# Patient Record
Sex: FEMALE | Race: BLACK OR AFRICAN AMERICAN | NOT HISPANIC OR LATINO | Employment: OTHER | URBAN - METROPOLITAN AREA
[De-identification: names, ages, dates, MRNs, and addresses within clinical notes are randomized per-mention and may not be internally consistent; named-entity substitution may affect disease eponyms.]

---

## 2019-10-24 ENCOUNTER — HISTORICAL (OUTPATIENT)
Dept: RADIOLOGY | Facility: HOSPITAL | Age: 39
End: 2019-10-24

## 2021-01-15 ENCOUNTER — HISTORICAL (OUTPATIENT)
Dept: RADIOLOGY | Facility: HOSPITAL | Age: 41
End: 2021-01-15

## 2021-01-15 LAB
ABS NEUT (OLG): 3.4 X10(3)/MCL (ref 1.5–6.9)
ALBUMIN SERPL-MCNC: 3.7 GM/DL (ref 3.5–5)
ALBUMIN/GLOB SERPL: 0.8 RATIO (ref 1.1–2)
ALP SERPL-CCNC: 96 UNIT/L (ref 40–150)
ALT SERPL-CCNC: 21 UNIT/L (ref 0–55)
APPEARANCE, UA: ABNORMAL
AST SERPL-CCNC: 22 UNIT/L (ref 5–34)
BACTERIA SPEC CULT: ABNORMAL /HPF
BILIRUB SERPL-MCNC: 0.3 MG/DL
BILIRUB UR QL STRIP: NEGATIVE
BILIRUBIN DIRECT+TOT PNL SERPL-MCNC: 0.1 MG/DL (ref 0–0.5)
BILIRUBIN DIRECT+TOT PNL SERPL-MCNC: 0.2 MG/DL (ref 0–0.8)
BUN SERPL-MCNC: 12 MG/DL (ref 7–18.7)
CALCIUM SERPL-MCNC: 10.1 MG/DL (ref 8.4–10.2)
CHLORIDE SERPL-SCNC: 101 MMOL/L (ref 98–107)
CHOLEST SERPL-MCNC: 180 MG/DL
CHOLEST/HDLC SERPL: 3 {RATIO} (ref 0–5)
CO2 SERPL-SCNC: 26 MMOL/L (ref 22–29)
COLOR UR: YELLOW
CREAT SERPL-MCNC: 1.05 MG/DL (ref 0.55–1.02)
DEPRECATED CALCIDIOL+CALCIFEROL SERPL-MC: 15.1 NG/ML (ref 30–80)
ERYTHROCYTE [DISTWIDTH] IN BLOOD BY AUTOMATED COUNT: 14.5 % (ref 11.5–17)
GLOBULIN SER-MCNC: 4.9 GM/DL (ref 2.4–3.5)
GLUCOSE (UA): NEGATIVE MG/DL
GLUCOSE SERPL-MCNC: 99 MG/DL (ref 74–100)
HCT VFR BLD AUTO: 43.3 % (ref 36–48)
HDLC SERPL-MCNC: 58 MG/DL (ref 35–60)
HGB BLD-MCNC: 14.3 GM/DL (ref 12–16)
HGB UR QL STRIP: NEGATIVE
KETONES UR QL STRIP: NEGATIVE MG/DL
LDLC SERPL CALC-MCNC: 99 MG/DL (ref 50–140)
LEUKOCYTE ESTERASE UR QL STRIP: ABNORMAL
MCH RBC QN AUTO: 33 PG (ref 27–34)
MCHC RBC AUTO-ENTMCNC: 33 GM/DL (ref 31–36)
MCV RBC AUTO: 101 FL (ref 80–99)
NITRITE UR QL STRIP: POSITIVE
PH UR STRIP: 7 [PH] (ref 4.6–8)
PLATELET # BLD AUTO: 266 X10(3)/MCL (ref 140–400)
PMV BLD AUTO: 10.7 FL (ref 6.8–10)
POTASSIUM SERPL-SCNC: 4.4 MMOL/L (ref 3.5–5.1)
PROT SERPL-MCNC: 8.6 GM/DL (ref 6.4–8.3)
PROT UR QL STRIP: NEGATIVE MG/DL
RBC # BLD AUTO: 4.29 X10(6)/MCL (ref 4.2–5.4)
RBC #/AREA URNS HPF: ABNORMAL /[HPF]
SODIUM SERPL-SCNC: 138 MMOL/L (ref 136–145)
SP GR UR STRIP: 1.02 (ref 1–1.03)
SQUAMOUS EPITHELIAL, UA: ABNORMAL /LPF
TRIGL SERPL-MCNC: 117 MG/DL (ref 37–140)
TSH SERPL-ACNC: 1.41 UIU/ML (ref 0.35–4.94)
UROBILINOGEN UR STRIP-ACNC: 0.2 EU/DL
VLDLC SERPL CALC-MCNC: 23 MG/DL
WBC # SPEC AUTO: 5.4 X10(3)/MCL (ref 4.5–11.5)
WBC #/AREA URNS HPF: ABNORMAL /HPF

## 2022-02-08 ENCOUNTER — HISTORICAL (OUTPATIENT)
Dept: ADMINISTRATIVE | Facility: HOSPITAL | Age: 42
End: 2022-02-08

## 2022-02-08 ENCOUNTER — HISTORICAL (OUTPATIENT)
Dept: RADIOLOGY | Facility: HOSPITAL | Age: 42
End: 2022-02-08

## 2023-03-04 ENCOUNTER — HOSPITAL ENCOUNTER (EMERGENCY)
Facility: HOSPITAL | Age: 43
Discharge: HOME | End: 2023-03-04
Attending: INTERNAL MEDICINE
Payer: MEDICARE

## 2023-03-04 VITALS
DIASTOLIC BLOOD PRESSURE: 104 MMHG | SYSTOLIC BLOOD PRESSURE: 162 MMHG | BODY MASS INDEX: 45.52 KG/M2 | TEMPERATURE: 99 F | WEIGHT: 290 LBS | HEART RATE: 83 BPM | OXYGEN SATURATION: 99 % | HEIGHT: 67 IN | RESPIRATION RATE: 16 BRPM

## 2023-03-04 DIAGNOSIS — N61.1 BREAST ABSCESS: ICD-10-CM

## 2023-03-04 DIAGNOSIS — N64.4 BREAST PAIN, LEFT: Primary | ICD-10-CM

## 2023-03-04 PROCEDURE — 99285 EMERGENCY DEPT VISIT HI MDM: CPT | Mod: 25

## 2023-03-04 PROCEDURE — 25500020 PHARM REV CODE 255: Performed by: INTERNAL MEDICINE

## 2023-03-04 RX ORDER — DOXYCYCLINE 100 MG/1
100 CAPSULE ORAL 2 TIMES DAILY
Qty: 20 CAPSULE | Refills: 0 | Status: SHIPPED | OUTPATIENT
Start: 2023-03-04 | End: 2023-03-14

## 2023-03-04 RX ORDER — TRAMADOL HYDROCHLORIDE 50 MG/1
50 TABLET ORAL EVERY 6 HOURS PRN
Qty: 12 TABLET | Refills: 0 | Status: ON HOLD | OUTPATIENT
Start: 2023-03-04 | End: 2023-09-02

## 2023-03-04 RX ADMIN — IOPAMIDOL 100 ML: 755 INJECTION, SOLUTION INTRAVENOUS at 05:03

## 2023-03-04 NOTE — ED PROVIDER NOTES
Encounter Date: 3/4/2023       History     Chief Complaint   Patient presents with    Breast Pain     Pt reports left breast painful and swollen since wed.       42-year-old female presents to the emergency department with painful left breast with swelling since Wednesday of last week.  Patient states she began with pain and noticed it was very hard and firm and states she has an appointment with her PCP on the  but wanted to come in to get evaluated for this.  Denies any fevers chills or other associated symptoms except for the swelling in the pain to the left breast.      Review of patient's allergies indicates:  No Known Allergies  Past Medical History:   Diagnosis Date    Hypertension     Mental health disorder      Past Surgical History:   Procedure Laterality Date     SECTION      HYSTERECTOMY       No family history on file.  Social History     Tobacco Use    Smoking status: Every Day     Types: Cigarettes    Smokeless tobacco: Never   Substance Use Topics    Alcohol use: Yes     Comment: socially     Review of Systems   Constitutional:  Negative for activity change, appetite change and fever.   HENT:  Negative for congestion, dental problem and sore throat.    Eyes:  Negative for discharge and itching.   Respiratory:  Negative for apnea, chest tightness and shortness of breath.    Cardiovascular:  Negative for chest pain.   Gastrointestinal:  Negative for abdominal distention, abdominal pain and nausea.   Genitourinary:  Negative for dysuria, vaginal bleeding, vaginal discharge and vaginal pain.   Musculoskeletal:  Negative for back pain.   Skin:  Negative for rash.   Neurological:  Negative for dizziness, facial asymmetry and weakness.   Hematological:  Does not bruise/bleed easily.   Psychiatric/Behavioral:  Negative for agitation and behavioral problems.    All other systems reviewed and are negative.    Physical Exam     Initial Vitals [23 1335]   BP Pulse Resp Temp SpO2   (!) 165/99 98  16 98.5 °F (36.9 °C) 100 %      MAP       --         Physical Exam    Nursing note and vitals reviewed.  Constitutional: Vital signs are normal. She appears well-developed and well-nourished.  Non-toxic appearance. She does not have a sickly appearance.   HENT:   Head: Normocephalic and atraumatic.   Right Ear: External ear normal.   Left Ear: External ear normal.   Eyes: Conjunctivae, EOM and lids are normal. Pupils are equal, round, and reactive to light. Lids are everted and swept, no foreign bodies found.   Neck: Trachea normal and phonation normal. Neck supple. No thyroid mass and no thyromegaly present.   Normal range of motion.   Full passive range of motion without pain.     Cardiovascular:  Normal rate, regular rhythm, S1 normal, S2 normal, normal heart sounds, intact distal pulses and normal pulses.           Pulmonary/Chest: Breath sounds normal. No respiratory distress.   Abdominal: Abdomen is soft.   Musculoskeletal:         General: Normal range of motion.      Cervical back: Full passive range of motion without pain, normal range of motion and neck supple.     Lymphadenopathy:     She has no cervical adenopathy.   Neurological: She is alert and oriented to person, place, and time. She has normal strength. GCS score is 15. GCS eye subscore is 4. GCS verbal subscore is 5. GCS motor subscore is 6.   Skin: Skin is warm, dry and intact. Capillary refill takes less than 2 seconds.   Hard firm breast mass palpated along the left lateral breast that extends from around her axilla towards the front of the breast towards the nipple.  No pus or drainage.  No nodules appreciated or any fluctuance.   Psychiatric: She has a normal mood and affect. Her speech is normal and behavior is normal. Judgment normal. Cognition and memory are normal.       ED Course   Procedures  Labs Reviewed - No data to display       Imaging Results              CT Head Without Contrast (Preliminary result)  Result time 03/04/23 18:25:16       Preliminary result by Emmett Beltran MD (03/04/23 18:25:16)                   Narrative:    START OF REPORT:  Technique: CT of the head was performed without intravenous contrast with axial as well as coronal and sagittal images.    Comparison: None.    Dosage Information: Automated Exposure Control was utilized 819.34 mGy.cm.    Clinical history: Possible mets. pt states left breast pains.    Findings:  Hemorrhage: No acute intracranial hemorrhage is seen.  CSF spaces: The ventricles sulci and basal cisterns are within normal limits.  Brain parenchyma: Unremarkable with preservation of the grey white junction throughout.  Cerebellum: Unremarkable.  Sella and skull base: The sella appears to be within normal limits for age.  Intracranial calcifications: Incidental note is made of some pineal region calcification.  Calvarium: No acute linear or depressed skull fracture is seen.    Maxillofacial Structures:  Paranasal sinuses: The visualized paranasal sinuses appear clear with no mucoperiosteal thickening or air fluid levels identified.  Orbits: The orbits appear unremarkable.  Zygomatic arches: The zygomatic arches are intact and unremarkable.  Temporal bones and mastoids: The temporal bones and mastoids appear unremarkable.  TMJ: The mandibular condyles appear normally placed with respect to the mandibular fossa.      Impression:  1. No acute intracranial process identified. Details and other findings as noted above.                          Preliminary result by [employer] Water, Rad Results In (03/04/23 18:25:16)                   Narrative:    START OF REPORT:  Technique: CT of the head was performed without intravenous contrast with axial as well as coronal and sagittal images.    Comparison: None.    Dosage Information: Automated Exposure Control was utilized 819.34 mGy.cm.    Clinical history: Possible mets. pt states left breast pains.    Findings:  Hemorrhage: No acute intracranial hemorrhage is seen.  CSF  spaces: The ventricles sulci and basal cisterns are within normal limits.  Brain parenchyma: Unremarkable with preservation of the grey white junction throughout.  Cerebellum: Unremarkable.  Sella and skull base: The sella appears to be within normal limits for age.  Intracranial calcifications: Incidental note is made of some pineal region calcification.  Calvarium: No acute linear or depressed skull fracture is seen.    Maxillofacial Structures:  Paranasal sinuses: The visualized paranasal sinuses appear clear with no mucoperiosteal thickening or air fluid levels identified.  Orbits: The orbits appear unremarkable.  Zygomatic arches: The zygomatic arches are intact and unremarkable.  Temporal bones and mastoids: The temporal bones and mastoids appear unremarkable.  TMJ: The mandibular condyles appear normally placed with respect to the mandibular fossa.      Impression:  1. No acute intracranial process identified. Details and other findings as noted above.                                         CT Chest Abdomen Pelvis With Contrast (xpd) (In process)    Procedure changed from CT Abdomen Pelvis With Contrast                    Medications   iopamidoL (ISOVUE-370) injection 100 mL (100 mLs Intravenous Given 3/4/23 1708)                 ED Course as of 03/04/23 1810   Sat Mar 04, 2023   1404 Wanted to do US of breast for this patient however I have been informed by the head radiology dept that a radiologist has to be present to have an US of breast done. Will treat this as infectious since it was so rapidly formed and this is highly possible. Patient aware that she should contact her PCP ASAP for referral for US of breast.  [SL]   5173 Spoke to Dr. Mccarthy who will see the patient for follow up and biopsy.  [SL]      ED Course User Index  [SL] AYLEEN Rutledge          Medical Decision Making  42-year-old female with left breast mass presents to the emergency department for evaluation.  Patient states she  noticed this last Wednesday and has not had any treatment for since noticing it.    Problems Addressed:  Breast abscess: acute illness or injury     Details: Will refer for general surgery and have patient follow-up with PCP for breast ultrasound.  Breast pain, left: acute illness or injury     Details: Will treat with short course opioid pain medication.  Patient had safe opioid use discussed.    Amount and/or Complexity of Data Reviewed  External Data Reviewed: notes.  Discussion of management or test interpretation with external provider(s): Patient has breast mass with slight about of worth but no erythema or fluctuance noted.  Discussed with her that she does fairly needed ultrasound of breast tissue but we were unable to do that here.            Clinical Impression:   Final diagnoses:  [N64.4] Breast pain, left (Primary)  [N61.1] Breast abscess        ED Disposition Condition    Discharge Stable          ED Prescriptions       Medication Sig Dispense Start Date End Date Auth. Provider    doxycycline (VIBRAMYCIN) 100 MG Cap Take 1 capsule (100 mg total) by mouth 2 (two) times daily. for 10 days 20 capsule 3/4/2023 3/14/2023 AYLEEN Rutledge    traMADoL (ULTRAM) 50 mg tablet Take 1 tablet (50 mg total) by mouth every 6 (six) hours as needed for Pain. 12 tablet 3/4/2023 -- AYLEEN Rutledge          Follow-up Information       Follow up With Specialties Details Why Contact Info    David Mccarthy MD General Surgery, Cardiology Schedule an appointment as soon as possible for a visit  As needed, For ER Follow Up. [address]  [address]  [place] [address]  [phone]               AYLEEN Rutledge  03/04/23 1420       AYLEEN Rutledge  03/04/23 1433       AYLEEN Rutledge  03/04/23 2807

## 2023-03-04 NOTE — DISCHARGE INSTRUCTIONS
You need to see one of the surgeons to reevaluate, do further workup and treat as needed as soon as possible    Take medicines as prescribed    Talk to your family doctor to refer you to the surgeon.    David Mccarthy MD  [phone]    Jacquie Mccarthy MD  [phone]    Kemi Hodgson MD.  [phone]

## 2023-03-04 NOTE — CONSULTS
Patient is a 42-year-old  female morbidly obese at 5 ft 7 300 lb who had a left breast mass with associated swelling since Wednesday of last week.  Patient noticed a very hard firm area on her left breast.  She has an appointment with her primary care physician Farshad Wade [facility] nurse practitioner on 2023.  Patient denies any fever chills.  She is not on any hormonal therapy.  She is a  AB0 0.    No known drug allergies     Past medical history  1. Neuropathy hands leg and feet etiology unclear making her disabled   2. Hypertension  3. Morbid obesity at 5 ft 7 300 lb   4. Schizophrenia on Risperdal     past surgical history   1. Status post  x2   2. Partial thyroidectomy 20 years ago  3. No EGD or colonoscopy  4. Mammogram Pap smear over 15 years ago   5. Stress test 2 years ago with Holter monitor  6. No echo or angiogram done    Family history  1. Mother had a heart attack   2. Father family history not available he was unavailable but he is living   3. Maternal aunt had breast cancer   4. Maternal aunt had throat cancer      Immunizations  1. No tetanus shot  2. No flu shot  3. No pneumonia shot  4. No COVID shot  5. No shingle shot    Social history  1. Smokes quarter pack a day for about 25 years   2. Drinks 2 bottles of wine a day for about 20 years   3. No drug abuse history  4. No alf parole or probation  5. No  service   6. Was in rehab for schizophrenia in  she was seeing hallucinations  7. Has a 9th grade level of education is illiterate can write her name  8. Disabled from neuropathy hands feet and legs  9. She is unmarried single had 2 partners with 4 children a son that works as a manager at [employer] another son that works at a tire placed and [place] and the daughter that is caregiver and a 3rd daughter the 4th on her homemaker.  One of her daughters is in the room at time my evaluation  10. Lives in [place]  11. Primary care provider is  [place] nurse practitioner Wade    Review of systems   Twelve point review of systems otherwise unremarkable      Physical exam   Vital signs are stable   head ears nose throat is within normal limits  Neck is supple nontender lower collar incision from her thought partial thyroidectomy   Left breast has a grapefruit size mass the mass in his hard firm mobile occupying half the breast, right breast appears to be normal  Heart is regular rate rhythm   Lungs are clear to auscultation  Abdomen is soft nontender nondistended Pfannenstiel incision noted  Extremities are within normal limits  Neurologically intact with the exception of a neuropathy    Assessment  Patient is a 42-year-old  female morbidly obese at 5 ft 7 300 lb who had a left breast mass with associated swelling since Wednesday of last week.  Patient noticed a very hard firm area on her left breast.  She has an appointment with her primary care physician Mr. Wade [facility] nurse practitioner on 2023.  Patient denies any fever chills.  She is not on any hormonal therapy.  She is a  AB0 0.    Plan  1. CT head chest abdomen pelvis to evaluate the extent and severity of her left breast mass  2. Sotero-Cut biopsy of left breast as an outpatient this week  3. Further workup pending above

## 2023-03-06 ENCOUNTER — ANESTHESIA EVENT (OUTPATIENT)
Dept: SURGERY | Facility: HOSPITAL | Age: 43
End: 2023-03-06
Payer: MEDICARE

## 2023-03-06 ENCOUNTER — ANESTHESIA (OUTPATIENT)
Dept: SURGERY | Facility: HOSPITAL | Age: 43
End: 2023-03-06
Payer: MEDICARE

## 2023-03-06 ENCOUNTER — HOSPITAL ENCOUNTER (OUTPATIENT)
Dept: PREADMISSION TESTING | Facility: HOSPITAL | Age: 43
Discharge: HOME | End: 2023-03-06
Attending: SURGERY | Admitting: SURGERY
Payer: MEDICARE

## 2023-03-06 ENCOUNTER — HOSPITAL ENCOUNTER (OUTPATIENT)
Facility: HOSPITAL | Age: 43
Discharge: HOME | End: 2023-03-06
Attending: SURGERY | Admitting: SURGERY
Payer: MEDICARE

## 2023-03-06 VITALS — WEIGHT: 292 LBS | BODY MASS INDEX: 45.83 KG/M2 | HEIGHT: 67 IN

## 2023-03-06 VITALS
RESPIRATION RATE: 20 BRPM | SYSTOLIC BLOOD PRESSURE: 157 MMHG | OXYGEN SATURATION: 96 % | WEIGHT: 292 LBS | TEMPERATURE: 98 F | DIASTOLIC BLOOD PRESSURE: 99 MMHG | HEART RATE: 84 BPM | BODY MASS INDEX: 45.73 KG/M2

## 2023-03-06 DIAGNOSIS — N63.0 MASS OF BREAST, UNSPECIFIED LATERALITY: Primary | ICD-10-CM

## 2023-03-06 DIAGNOSIS — N63.21 UNSPECIFIED LUMP IN THE LEFT BREAST, UPPER OUTER QUADRANT: ICD-10-CM

## 2023-03-06 DIAGNOSIS — N63.21 MASS OF UPPER OUTER QUADRANT OF LEFT BREAST: Primary | ICD-10-CM

## 2023-03-06 DIAGNOSIS — N63.0 MASS OF BREAST, UNSPECIFIED LATERALITY: ICD-10-CM

## 2023-03-06 DIAGNOSIS — Z01.818 PRE-OP TESTING: ICD-10-CM

## 2023-03-06 LAB
ANION GAP SERPL CALC-SCNC: 8 MEQ/L (ref 2–13)
B-HCG SERPL QL: NEGATIVE
BASOPHILS # BLD AUTO: 0.02 X10(3)/MCL (ref 0.01–0.08)
BASOPHILS NFR BLD AUTO: 0.2 % (ref 0.1–1.2)
BUN SERPL-MCNC: 6 MG/DL (ref 7–20)
CALCIUM SERPL-MCNC: 9.4 MG/DL (ref 8.4–10.2)
CHLORIDE SERPL-SCNC: 100 MMOL/L (ref 98–110)
CO2 SERPL-SCNC: 31 MMOL/L (ref 21–32)
CREAT SERPL-MCNC: 0.72 MG/DL (ref 0.66–1.25)
CREAT/UREA NIT SERPL: 8 (ref 12–20)
EOSINOPHIL # BLD AUTO: 0.11 X10(3)/MCL (ref 0.04–0.36)
EOSINOPHIL NFR BLD AUTO: 1.2 % (ref 0.7–7)
ERYTHROCYTE [DISTWIDTH] IN BLOOD BY AUTOMATED COUNT: 18.6 % (ref 11–14.5)
GFR SERPLBLD CREATININE-BSD FMLA CKD-EPI: >90 MLS/MIN/1.73/M2
GLUCOSE SERPL-MCNC: 98 MG/DL (ref 70–115)
HCT VFR BLD AUTO: 33.9 % (ref 36–48)
HGB BLD-MCNC: 11.2 G/DL (ref 11.8–16)
IMM GRANULOCYTES # BLD AUTO: 0.07 X10(3)/MCL (ref 0–0.03)
IMM GRANULOCYTES NFR BLD AUTO: 0.8 % (ref 0–0.5)
LYMPHOCYTES # BLD AUTO: 1.59 X10(3)/MCL (ref 1.16–3.74)
LYMPHOCYTES NFR BLD AUTO: 17.8 % (ref 20–55)
MCH RBC QN AUTO: 33.7 PG (ref 27–34)
MCV RBC AUTO: 102.1 FL (ref 79–99)
MEAN CELL HEMOGLOBIN CONCENTRATION (OHS) G/DL: 33 G/DL (ref 31–37)
MONOCYTES # BLD AUTO: 0.46 X10(3)/MCL (ref 0.24–0.36)
MONOCYTES NFR BLD AUTO: 5.1 % (ref 4.7–12.5)
NEUTROPHILS # BLD AUTO: 6.69 X10(3)/MCL (ref 1.56–6.13)
NEUTROPHILS NFR BLD AUTO: 74.9 % (ref 37–73)
NRBC BLD AUTO-RTO: 0.2 % (ref 0–1)
PLATELET # BLD AUTO: 424 X10(3)/MCL (ref 140–371)
PMV BLD AUTO: 9.2 FL (ref 9.4–12.4)
POTASSIUM SERPL-SCNC: 3.9 MMOL/L (ref 3.5–5.1)
RBC # BLD AUTO: 3.32 X10(6)/MCL (ref 4–5.1)
SODIUM SERPL-SCNC: 139 MMOL/L (ref 135–145)
WBC # SPEC AUTO: 8.9 X10(3)/MCL (ref 4–11.5)

## 2023-03-06 PROCEDURE — 80048 BASIC METABOLIC PNL TOTAL CA: CPT | Performed by: SURGERY

## 2023-03-06 PROCEDURE — 37000008 HC ANESTHESIA 1ST 15 MINUTES: Performed by: SURGERY

## 2023-03-06 PROCEDURE — S5010 5% DEXTROSE AND 0.45% SALINE: HCPCS | Performed by: SURGERY

## 2023-03-06 PROCEDURE — 93010 EKG 12-LEAD: ICD-10-PCS | Mod: ,,, | Performed by: STUDENT IN AN ORGANIZED HEALTH CARE EDUCATION/TRAINING PROGRAM

## 2023-03-06 PROCEDURE — 36000706: Performed by: SURGERY

## 2023-03-06 PROCEDURE — 85025 COMPLETE CBC W/AUTO DIFF WBC: CPT | Performed by: SURGERY

## 2023-03-06 PROCEDURE — 63600175 PHARM REV CODE 636 W HCPCS: Performed by: NURSE ANESTHETIST, CERTIFIED REGISTERED

## 2023-03-06 PROCEDURE — 93010 ELECTROCARDIOGRAM REPORT: CPT | Mod: ,,, | Performed by: STUDENT IN AN ORGANIZED HEALTH CARE EDUCATION/TRAINING PROGRAM

## 2023-03-06 PROCEDURE — 25000003 PHARM REV CODE 250: Performed by: SURGERY

## 2023-03-06 PROCEDURE — D9220A PRA ANESTHESIA: ICD-10-PCS | Mod: ,,, | Performed by: NURSE ANESTHETIST, CERTIFIED REGISTERED

## 2023-03-06 PROCEDURE — 88305 TISSUE EXAM BY PATHOLOGIST: CPT

## 2023-03-06 PROCEDURE — 37000009 HC ANESTHESIA EA ADD 15 MINS: Performed by: SURGERY

## 2023-03-06 PROCEDURE — 81025 URINE PREGNANCY TEST: CPT | Performed by: SURGERY

## 2023-03-06 PROCEDURE — 36000707: Performed by: SURGERY

## 2023-03-06 PROCEDURE — D9220A PRA ANESTHESIA: Mod: ,,, | Performed by: NURSE ANESTHETIST, CERTIFIED REGISTERED

## 2023-03-06 PROCEDURE — 93005 ELECTROCARDIOGRAM TRACING: CPT | Mod: 59

## 2023-03-06 PROCEDURE — 25000003 PHARM REV CODE 250: Performed by: NURSE ANESTHETIST, CERTIFIED REGISTERED

## 2023-03-06 RX ORDER — MIDAZOLAM HYDROCHLORIDE 1 MG/ML
2 INJECTION INTRAMUSCULAR; INTRAVENOUS
Status: DISCONTINUED | OUTPATIENT
Start: 2023-03-07 | End: 2023-03-06 | Stop reason: HOSPADM

## 2023-03-06 RX ORDER — LIDOCAINE HYDROCHLORIDE 20 MG/ML
INJECTION INTRAVENOUS
Status: DISCONTINUED | OUTPATIENT
Start: 2023-03-06 | End: 2023-03-06

## 2023-03-06 RX ORDER — ETOMIDATE 2 MG/ML
INJECTION INTRAVENOUS
Status: DISCONTINUED | OUTPATIENT
Start: 2023-03-06 | End: 2023-03-06

## 2023-03-06 RX ORDER — DEXTROSE MONOHYDRATE AND SODIUM CHLORIDE 5; .45 G/100ML; G/100ML
INJECTION, SOLUTION INTRAVENOUS CONTINUOUS
Status: CANCELLED | OUTPATIENT
Start: 2023-03-06

## 2023-03-06 RX ORDER — FENTANYL CITRATE 50 UG/ML
INJECTION, SOLUTION INTRAMUSCULAR; INTRAVENOUS
Status: DISCONTINUED | OUTPATIENT
Start: 2023-03-06 | End: 2023-03-06

## 2023-03-06 RX ORDER — AMLODIPINE BESYLATE 10 MG/1
10 TABLET ORAL DAILY
COMMUNITY

## 2023-03-06 RX ORDER — CEFAZOLIN SODIUM 2 G/50ML
2 SOLUTION INTRAVENOUS
Status: CANCELLED | OUTPATIENT
Start: 2023-03-06

## 2023-03-06 RX ORDER — CEFAZOLIN SODIUM 2 G/50ML
2 SOLUTION INTRAVENOUS
Status: DISCONTINUED | OUTPATIENT
Start: 2023-03-06 | End: 2023-03-06 | Stop reason: HOSPADM

## 2023-03-06 RX ORDER — PROPOFOL 10 MG/ML
VIAL (ML) INTRAVENOUS
Status: DISCONTINUED | OUTPATIENT
Start: 2023-03-06 | End: 2023-03-06

## 2023-03-06 RX ORDER — FAMOTIDINE 20 MG/1
20 TABLET, FILM COATED ORAL
Status: COMPLETED | OUTPATIENT
Start: 2023-03-06 | End: 2023-03-06

## 2023-03-06 RX ORDER — LIDOCAINE HYDROCHLORIDE 10 MG/ML
INJECTION INFILTRATION; PERINEURAL
Status: DISCONTINUED | OUTPATIENT
Start: 2023-03-06 | End: 2023-03-06 | Stop reason: HOSPADM

## 2023-03-06 RX ORDER — ONDANSETRON 2 MG/ML
4 INJECTION INTRAMUSCULAR; INTRAVENOUS EVERY 12 HOURS PRN
Status: DISCONTINUED | OUTPATIENT
Start: 2023-03-06 | End: 2023-03-06 | Stop reason: HOSPADM

## 2023-03-06 RX ORDER — METOPROLOL TARTRATE 25 MG/1
25 TABLET, FILM COATED ORAL ONCE
Status: COMPLETED | OUTPATIENT
Start: 2023-03-06 | End: 2023-03-06

## 2023-03-06 RX ORDER — METOPROLOL TARTRATE 25 MG/1
25 TABLET, FILM COATED ORAL 2 TIMES DAILY
COMMUNITY

## 2023-03-06 RX ORDER — CEFAZOLIN SODIUM 1 G/3ML
INJECTION, POWDER, FOR SOLUTION INTRAMUSCULAR; INTRAVENOUS
Status: DISCONTINUED | OUTPATIENT
Start: 2023-03-06 | End: 2023-03-06

## 2023-03-06 RX ORDER — BUPIVACAINE HYDROCHLORIDE 5 MG/ML
INJECTION, SOLUTION EPIDURAL; INTRACAUDAL
Status: DISCONTINUED | OUTPATIENT
Start: 2023-03-06 | End: 2023-03-06 | Stop reason: HOSPADM

## 2023-03-06 RX ORDER — DEXTROSE MONOHYDRATE AND SODIUM CHLORIDE 5; .45 G/100ML; G/100ML
INJECTION, SOLUTION INTRAVENOUS CONTINUOUS
Status: DISCONTINUED | OUTPATIENT
Start: 2023-03-06 | End: 2023-03-06 | Stop reason: HOSPADM

## 2023-03-06 RX ORDER — HYDROMORPHONE HYDROCHLORIDE 1 MG/ML
0.5 INJECTION, SOLUTION INTRAMUSCULAR; INTRAVENOUS; SUBCUTANEOUS EVERY 6 HOURS PRN
Status: DISCONTINUED | OUTPATIENT
Start: 2023-03-06 | End: 2023-03-06 | Stop reason: HOSPADM

## 2023-03-06 RX ORDER — SODIUM CHLORIDE 9 MG/ML
INJECTION, SOLUTION INTRAVENOUS CONTINUOUS
Status: DISCONTINUED | OUTPATIENT
Start: 2023-03-06 | End: 2023-03-06 | Stop reason: HOSPADM

## 2023-03-06 RX ADMIN — METOPROLOL TARTRATE 25 MG: 25 TABLET, FILM COATED ORAL at 10:03

## 2023-03-06 RX ADMIN — ETOMIDATE 2 MG: 2 INJECTION INTRAVENOUS at 01:03

## 2023-03-06 RX ADMIN — PROPOFOL 20 MG: 10 INJECTION, EMULSION INTRAVENOUS at 01:03

## 2023-03-06 RX ADMIN — FENTANYL CITRATE 25 MCG: 50 INJECTION, SOLUTION INTRAMUSCULAR; INTRAVENOUS at 01:03

## 2023-03-06 RX ADMIN — GLYCOPYRROLATE 0.2 MG: 0.2 INJECTION, SOLUTION INTRAMUSCULAR; INTRAVITREAL at 10:03

## 2023-03-06 RX ADMIN — CEFAZOLIN 2 G: 1 INJECTION, POWDER, FOR SOLUTION INTRAMUSCULAR; INTRAVENOUS at 01:03

## 2023-03-06 RX ADMIN — LIDOCAINE HYDROCHLORIDE 50 MG: 20 INJECTION, SOLUTION INTRAVENOUS at 01:03

## 2023-03-06 RX ADMIN — PROPOFOL 30 MG: 10 INJECTION, EMULSION INTRAVENOUS at 01:03

## 2023-03-06 RX ADMIN — FENTANYL CITRATE 50 MCG: 50 INJECTION, SOLUTION INTRAMUSCULAR; INTRAVENOUS at 01:03

## 2023-03-06 RX ADMIN — DEXTROSE AND SODIUM CHLORIDE: 5; 450 INJECTION, SOLUTION INTRAVENOUS at 10:03

## 2023-03-06 RX ADMIN — FAMOTIDINE 20 MG: 20 TABLET, FILM COATED ORAL at 10:03

## 2023-03-06 NOTE — ANESTHESIA PREPROCEDURE EVALUATION
03/06/2023  Nichole Lopez is a 42 y.o., female.      Pre-op Assessment    I have reviewed the Patient Summary Reports.     I have reviewed the Nursing Notes. I have reviewed the NPO Status.   I have reviewed the Medications.     Review of Systems  Anesthesia Hx:  No problems with previous Anesthesia  Denies Family Hx of Anesthesia complications.   Denies Personal Hx of Anesthesia complications.   Hematology/Oncology:  Hematology Normal   Oncology Normal     EENT/Dental:EENT/Dental Normal   Cardiovascular:   Exercise tolerance: good Hypertension  Hypertension, Essential Hypertension    Pulmonary:  Pulmonary Normal  Obstructive Sleep Apnea (PATRICK).   Renal/:  Renal/ Normal     Hepatic/GI:  Hepatic/GI Normal    Musculoskeletal:  Musculoskeletal Normal    Neurological:  Neurology Normal    Endocrine:  Endocrine Normal    Dermatological:  Skin Normal    Psych:   Psychiatric History anxiety depression          Physical Exam  General: Well nourished, Cooperative, Alert and Oriented    Airway:  Mallampati: II / II  Mouth Opening: Normal  TM Distance: Normal  Tongue: Normal  Neck ROM: Normal ROM    Dental:  Intact        Anesthesia Plan  Type of Anesthesia, risks & benefits discussed:    Anesthesia Type: Gen ETT, MAC  Intra-op Monitoring Plan: Standard ASA Monitors  Induction:  IV  ASA Score: 4    Ready For Surgery From Anesthesia Perspective.     .

## 2023-03-06 NOTE — DISCHARGE INSTRUCTIONS

## 2023-03-06 NOTE — OP NOTE
[facility]  Operative Note      Date of Procedure: 3/6/2023     Procedure: Procedure(s) (LRB):  BIOPSY, BREAST (Left)     Surgeon(s) and Role:     * David Mccarthy MD - Primary    Assisting Surgeon: None    Pre-Operative Diagnosis: Unspecified lump in the left breast, upper outer quadrant [N63.21]    Post-Operative Diagnosis: Post-Op Diagnosis Codes:     * Unspecified lump in the left breast, upper outer quadrant [N63.21]    Anesthesia: Choice    Operative Findings (including complications, if any):  Patient is a 42-year-old  female with history of neuropathy hands and feet who has schizophrenia on Rocephin all, morbidly obese at 5 ft 7 inches 300 lb had a left breast mass that was 6 cm in size on CT scan.  Patient required needle biopsy.    Patient was brought to the OR supine position local and IV sedation were given and patient had a Sotero-Cut biopsy with a 18 gauge needle performed.  The mass was big enough, I could feel it and as a result not need ultrasound to verify biopsy of the biopsy specimens were obviously potentially malignant.  Patient had several passes with the Sotero-Cut needle done at least 10.  Patient had the insertion site of the Sotero-Cut needle sutured with 4-0 plain gut suture.  Sterile dressings were applied no problems encountered patient tolerated procedure well             Description of Technical Procedures:  Noted above           Significant Surgical Tasks Conducted by the Assistant(s), if Applicable:       Estimated Blood Loss (EBL): * No values recorded between 3/6/2023  1:33 PM and 3/6/2023  1:38 PM *           Implants: * No implants in log *    Specimens:   Specimen (24h ago, onward)      None                    Condition: Good    Disposition: PACU - hemodynamically stable.    Attestation: I was present and scrubbed for the entire procedure.    Discharge Note    OUTCOME: Patient tolerated treatment/procedure well without complication and is  now ready for discharge.    DISPOSITION: Home or Self Care    FINAL DIAGNOSIS:  Left breast mass 6 cm in size    FOLLOWUP: In clinic1 week    DISCHARGE INSTRUCTIONS:  No discharge procedures on file.

## 2023-03-06 NOTE — ANESTHESIA POSTPROCEDURE EVALUATION
Anesthesia Post Evaluation    Patient: Nichole Lopez    Procedure(s) Performed: Procedure(s) (LRB):  BIOPSY, BREAST (Left)    Final Anesthesia Type: MAC      Patient location during evaluation: OPS  Patient participation: Yes- Able to Participate  Level of consciousness: awake and alert, awake and oriented  Post-procedure vital signs: reviewed and stable  Pain management: adequate  Airway patency: patent    PONV status at discharge: No PONV  Anesthetic complications: no      Cardiovascular status: blood pressure returned to baseline  Respiratory status: unassisted, room air and spontaneous ventilation  Hydration status: euvolemic  Follow-up not needed.          Vitals Value Taken Time   /100 03/06/23 1023   Temp 36.2 °C (97.1 °F) 03/06/23 1004   Pulse 77 03/06/23 1004   Resp 18 03/06/23 1004   SpO2 96 % 03/06/23 1004         No case tracking events are documented in the log.      Pain/Johnny Score: No data recorded

## 2023-03-06 NOTE — DISCHARGE SUMMARY
[facility]  Short Stay    Procedure(s) (LRB):  BIOPSY, BREAST (Left) using an 18 gauge Sotero-Cut needle      OUTCOME: Patient tolerated treatment/procedure well without complication and is now ready for discharge.    DISPOSITION: Home or Self Care    FINAL DIAGNOSIS:  Left breast mass 6 cm in size on CT scan    FOLLOWUP: In clinic 1 week    DISCHARGE INSTRUCTIONS:  No discharge procedures on file.     TIME SPENT ON DISCHARGE:   Five minutes

## 2023-03-06 NOTE — DISCHARGE INSTRUCTIONS
DECREASED ACTIVITY TODAY, NO HEAVY LIFTING OR STRAINING, NO PUSHING OR PULLING, NO DRIVING TODAY OR WHILE ON PAIN MEDS, SHOWERS ONLY, TO TUB BATHS, DECREASED ACTIVITY UNTIL FOLLOW UP APPOINTMENT.    CALL YOUR DOCTOR IF YOU HAVE ANY OF THE FOLLOWING: ELEVATED TEMP  OR GREATER, INCREASED SWELLING, FOUL DRAINAGE FROM WOUND/INCISION, PAIN UNRELIEVED BY MEDICATION, NAUSEA AND /OR VOMITING, WEAKNESS, EXCESSIVE BRIGHT RED BLEEDING FROM SITE, DIFFICULTY BREATHING OR SWALLOWING.            Exofin  PATIENT AFTER-CARE INSTRUCTIONS          Your wound has been repaired using Exofin® High Viscosity topical tissue adhesive. The list below is a guide for you to understand and care for your wound following your procedure.          1. Keep the wound dry.     You may occasionally and briefly wet your wound in the shower or bath at the direction of your physician, but do not soak or scrub the wound area. After showering or bathing, gently blot your wound dry with a soft towel.       2. Avoid Topical Medications     Do not apply liquid or ointment medications, lotions, creams, petroleum jelly, mineral oils or any other product to your wound while the Exofin® adhesive film is in place.         3. Do not rub, scratch, or pick at the wound.     Doing so may compromise the integrity of the wound closure and cause scaring.        4. Protect the wound from prolonged sunlight exposure.     Do not use tanning lamps while the film is in place.        5. Check wound appearance.      Some swelling, redness, and pain are common with all wounds and normally will go away as the wound heals. If swelling, redness, or pain increases, or if the wound feels warm to the touch, contact your doctor. Also contact your doctor if the wound edges reopen or separate.        6. Exofin® will naturally slough off between 5 and 10 days after the procedure.     By this time, your wound should be sufficiently healed. This information is not intended as a  substitute for the advice of a physician. For more detailed information, talk to your doctor. Your doctor can choose the best treatment for you in your particular circumstances.               For additional questions and concerns, please consult your doctor.

## 2023-04-17 ENCOUNTER — HOSPITAL ENCOUNTER (OUTPATIENT)
Dept: PREADMISSION TESTING | Facility: HOSPITAL | Age: 43
Discharge: HOME | End: 2023-04-17
Payer: MEDICARE

## 2023-04-17 ENCOUNTER — HOSPITAL ENCOUNTER (OUTPATIENT)
Dept: RADIOLOGY | Facility: HOSPITAL | Age: 43
Discharge: HOME | End: 2023-04-17
Attending: SURGERY
Payer: MEDICARE

## 2023-04-17 VITALS — HEIGHT: 67 IN | WEIGHT: 292.19 LBS | BODY MASS INDEX: 45.86 KG/M2

## 2023-04-17 DIAGNOSIS — N63.20 MASS OF LEFT BREAST, UNSPECIFIED QUADRANT: Primary | ICD-10-CM

## 2023-04-17 DIAGNOSIS — N63.20 MASS OF LEFT BREAST, UNSPECIFIED QUADRANT: ICD-10-CM

## 2023-04-17 PROCEDURE — 71046 X-RAY EXAM CHEST 2 VIEWS: CPT | Mod: TC

## 2023-04-17 RX ORDER — CEFAZOLIN SODIUM IN 0.9 % NACL 2 G/100 ML
2 PLASTIC BAG, INJECTION (ML) INTRAVENOUS
Status: CANCELLED | OUTPATIENT
Start: 2023-04-21

## 2023-04-17 RX ORDER — DEXTROSE MONOHYDRATE AND SODIUM CHLORIDE 5; .45 G/100ML; G/100ML
INJECTION, SOLUTION INTRAVENOUS CONTINUOUS
Status: CANCELLED | OUTPATIENT
Start: 2023-04-17

## 2023-04-17 RX ORDER — RISPERIDONE 2 MG/1
2 TABLET ORAL 2 TIMES DAILY
COMMUNITY
Start: 2023-04-06

## 2023-04-17 NOTE — DISCHARGE INSTRUCTIONS

## 2023-04-20 ENCOUNTER — ANESTHESIA EVENT (OUTPATIENT)
Dept: SURGERY | Facility: HOSPITAL | Age: 43
End: 2023-04-20
Payer: MEDICARE

## 2023-04-20 NOTE — ANESTHESIA PREPROCEDURE EVALUATION
04/20/2023  Nichole Lopez is a 42 y.o., female.      Pre-op Assessment    I have reviewed the Patient Summary Reports.     I have reviewed the Nursing Notes. I have reviewed the NPO Status.   I have reviewed the Medications.     Review of Systems  Anesthesia Hx:  No problems with previous Anesthesia  Denies Family Hx of Anesthesia complications.   Denies Personal Hx of Anesthesia complications.   Social:  Smoker, Alcohol Use    Hematology/Oncology:  Hematology Normal   Oncology Normal     EENT/Dental:EENT/Dental Normal   Cardiovascular:   Exercise tolerance: good Hypertension, well controlled    Pulmonary:  Pulmonary Normal    Renal/:  Renal/ Normal     Hepatic/GI:  Hepatic/GI Normal    Musculoskeletal:  Musculoskeletal Normal    Neurological:  Neurology Normal    Endocrine:  Endocrine Normal  Morbid Obesity / BMI > 40  Dermatological:  Skin Normal    Psych:   Psychiatric History anxiety depression          Physical Exam  General: Well nourished, Cooperative, Alert and Oriented    Airway:  Mallampati: II / II  Mouth Opening: Normal  TM Distance: Normal  Tongue: Normal  Neck ROM: Normal ROM    Dental:  Intact        Anesthesia Plan  Type of Anesthesia, risks & benefits discussed:    Anesthesia Type: MAC, Gen ETT  Intra-op Monitoring Plan: Standard ASA Monitors  Post Op Pain Control Plan: multimodal analgesia  Induction:  IV  Airway Plan: Direct  Informed Consent: Informed consent signed with the Patient and all parties understand the risks and agree with anesthesia plan.  All questions answered. Patient consented to blood products? Yes  ASA Score: 3  Day of Surgery Review of History & Physical: H&P Update referred to the surgeon/provider.I have interviewed and examined the patient. I have reviewed the patient's H&P dated: There are no significant changes.     Ready For Surgery From Anesthesia Perspective.      .

## 2023-04-21 ENCOUNTER — HOSPITAL ENCOUNTER (OUTPATIENT)
Facility: HOSPITAL | Age: 43
Discharge: HOME | End: 2023-04-21
Attending: SURGERY | Admitting: SURGERY
Payer: MEDICARE

## 2023-04-21 ENCOUNTER — ANESTHESIA (OUTPATIENT)
Dept: SURGERY | Facility: HOSPITAL | Age: 43
End: 2023-04-21
Payer: MEDICARE

## 2023-04-21 VITALS
BODY MASS INDEX: 45.83 KG/M2 | SYSTOLIC BLOOD PRESSURE: 119 MMHG | DIASTOLIC BLOOD PRESSURE: 74 MMHG | OXYGEN SATURATION: 98 % | HEIGHT: 67 IN | WEIGHT: 292 LBS | HEART RATE: 96 BPM | RESPIRATION RATE: 20 BRPM | TEMPERATURE: 98 F

## 2023-04-21 DIAGNOSIS — N63.20 MASS OF LEFT BREAST, UNSPECIFIED QUADRANT: Primary | ICD-10-CM

## 2023-04-21 DIAGNOSIS — N61.1 BREAST ABSCESS: ICD-10-CM

## 2023-04-21 DIAGNOSIS — N63.21 MASS OF UPPER OUTER QUADRANT OF LEFT BREAST: ICD-10-CM

## 2023-04-21 LAB — B-HCG SERPL QL: NEGATIVE

## 2023-04-21 PROCEDURE — D9220A PRA ANESTHESIA: ICD-10-PCS | Mod: ,,, | Performed by: NURSE ANESTHETIST, CERTIFIED REGISTERED

## 2023-04-21 PROCEDURE — 71000016 HC POSTOP RECOV ADDL HR: Performed by: SURGERY

## 2023-04-21 PROCEDURE — 36000706: Performed by: SURGERY

## 2023-04-21 PROCEDURE — 87070 CULTURE OTHR SPECIMN AEROBIC: CPT | Performed by: SURGERY

## 2023-04-21 PROCEDURE — 71000033 HC RECOVERY, INTIAL HOUR: Performed by: SURGERY

## 2023-04-21 PROCEDURE — 87205 SMEAR GRAM STAIN: CPT | Performed by: SURGERY

## 2023-04-21 PROCEDURE — 25000003 PHARM REV CODE 250: Performed by: SURGERY

## 2023-04-21 PROCEDURE — 36000707: Performed by: SURGERY

## 2023-04-21 PROCEDURE — 63600175 PHARM REV CODE 636 W HCPCS: Performed by: SURGERY

## 2023-04-21 PROCEDURE — D9220A PRA ANESTHESIA: Mod: ,,, | Performed by: NURSE ANESTHETIST, CERTIFIED REGISTERED

## 2023-04-21 PROCEDURE — 87075 CULTR BACTERIA EXCEPT BLOOD: CPT | Performed by: SURGERY

## 2023-04-21 PROCEDURE — C1729 CATH, DRAINAGE: HCPCS | Performed by: SURGERY

## 2023-04-21 PROCEDURE — 37000009 HC ANESTHESIA EA ADD 15 MINS: Performed by: SURGERY

## 2023-04-21 PROCEDURE — 25000003 PHARM REV CODE 250: Performed by: NURSE ANESTHETIST, CERTIFIED REGISTERED

## 2023-04-21 PROCEDURE — 37000008 HC ANESTHESIA 1ST 15 MINUTES: Performed by: SURGERY

## 2023-04-21 PROCEDURE — S5010 5% DEXTROSE AND 0.45% SALINE: HCPCS | Performed by: SURGERY

## 2023-04-21 PROCEDURE — 71000015 HC POSTOP RECOV 1ST HR: Performed by: SURGERY

## 2023-04-21 PROCEDURE — 27201423 OPTIME MED/SURG SUP & DEVICES STERILE SUPPLY: Performed by: SURGERY

## 2023-04-21 PROCEDURE — 63600175 PHARM REV CODE 636 W HCPCS: Performed by: NURSE ANESTHETIST, CERTIFIED REGISTERED

## 2023-04-21 PROCEDURE — 81025 URINE PREGNANCY TEST: CPT | Performed by: SURGERY

## 2023-04-21 RX ORDER — ONDANSETRON 2 MG/ML
4 INJECTION INTRAMUSCULAR; INTRAVENOUS EVERY 12 HOURS PRN
Status: DISCONTINUED | OUTPATIENT
Start: 2023-04-21 | End: 2023-04-21 | Stop reason: HOSPADM

## 2023-04-21 RX ORDER — LIDOCAINE HYDROCHLORIDE 10 MG/ML
INJECTION INFILTRATION; PERINEURAL
Status: DISCONTINUED | OUTPATIENT
Start: 2023-04-21 | End: 2023-04-21 | Stop reason: HOSPADM

## 2023-04-21 RX ORDER — DEXTROSE MONOHYDRATE AND SODIUM CHLORIDE 5; .45 G/100ML; G/100ML
INJECTION, SOLUTION INTRAVENOUS CONTINUOUS
Status: DISCONTINUED | OUTPATIENT
Start: 2023-04-21 | End: 2023-04-21 | Stop reason: HOSPADM

## 2023-04-21 RX ORDER — SODIUM CHLORIDE 9 MG/ML
INJECTION, SOLUTION INTRAVENOUS CONTINUOUS
Status: DISCONTINUED | OUTPATIENT
Start: 2023-04-21 | End: 2023-04-21 | Stop reason: HOSPADM

## 2023-04-21 RX ORDER — KETOROLAC TROMETHAMINE 30 MG/ML
INJECTION, SOLUTION INTRAMUSCULAR; INTRAVENOUS
Status: DISCONTINUED | OUTPATIENT
Start: 2023-04-21 | End: 2023-04-21

## 2023-04-21 RX ORDER — NEOSTIGMINE METHYLSULFATE 1 MG/ML
INJECTION, SOLUTION INTRAVENOUS
Status: DISCONTINUED | OUTPATIENT
Start: 2023-04-21 | End: 2023-04-21

## 2023-04-21 RX ORDER — GLYCOPYRROLATE 0.2 MG/ML
INJECTION INTRAMUSCULAR; INTRAVENOUS
Status: DISCONTINUED | OUTPATIENT
Start: 2023-04-21 | End: 2023-04-21

## 2023-04-21 RX ORDER — CEFAZOLIN SODIUM IN 0.9 % NACL 2 G/100 ML
2 PLASTIC BAG, INJECTION (ML) INTRAVENOUS
Status: DISCONTINUED | OUTPATIENT
Start: 2023-04-21 | End: 2023-04-21

## 2023-04-21 RX ORDER — BUPIVACAINE HYDROCHLORIDE 5 MG/ML
INJECTION, SOLUTION EPIDURAL; INTRACAUDAL
Status: DISCONTINUED | OUTPATIENT
Start: 2023-04-21 | End: 2023-04-21 | Stop reason: HOSPADM

## 2023-04-21 RX ORDER — MIDAZOLAM HYDROCHLORIDE 1 MG/ML
2 INJECTION INTRAMUSCULAR; INTRAVENOUS
Status: COMPLETED | OUTPATIENT
Start: 2023-04-21 | End: 2023-04-21

## 2023-04-21 RX ORDER — HYDROMORPHONE HYDROCHLORIDE 1 MG/ML
0.5 INJECTION, SOLUTION INTRAMUSCULAR; INTRAVENOUS; SUBCUTANEOUS EVERY 6 HOURS PRN
Status: DISCONTINUED | OUTPATIENT
Start: 2023-04-21 | End: 2023-04-21 | Stop reason: HOSPADM

## 2023-04-21 RX ORDER — FAMOTIDINE 20 MG/1
20 TABLET, FILM COATED ORAL
Status: COMPLETED | OUTPATIENT
Start: 2023-04-21 | End: 2023-04-21

## 2023-04-21 RX ORDER — PROPOFOL 10 MG/ML
VIAL (ML) INTRAVENOUS
Status: DISCONTINUED | OUTPATIENT
Start: 2023-04-21 | End: 2023-04-21

## 2023-04-21 RX ORDER — ONDANSETRON 2 MG/ML
INJECTION INTRAMUSCULAR; INTRAVENOUS
Status: DISCONTINUED | OUTPATIENT
Start: 2023-04-21 | End: 2023-04-21

## 2023-04-21 RX ORDER — FENTANYL CITRATE 50 UG/ML
INJECTION, SOLUTION INTRAMUSCULAR; INTRAVENOUS
Status: DISCONTINUED | OUTPATIENT
Start: 2023-04-21 | End: 2023-04-21

## 2023-04-21 RX ORDER — VECURONIUM BROMIDE FOR INJECTION 1 MG/ML
INJECTION, POWDER, LYOPHILIZED, FOR SOLUTION INTRAVENOUS
Status: DISCONTINUED | OUTPATIENT
Start: 2023-04-21 | End: 2023-04-21

## 2023-04-21 RX ADMIN — ONDANSETRON 4 MG: 2 INJECTION INTRAMUSCULAR; INTRAVENOUS at 12:04

## 2023-04-21 RX ADMIN — KETOROLAC TROMETHAMINE 30 MG: 30 INJECTION, SOLUTION INTRAMUSCULAR; INTRAVENOUS at 12:04

## 2023-04-21 RX ADMIN — VECURONIUM BROMIDE 5 MG: 10 INJECTION, POWDER, FOR SOLUTION INTRAVENOUS at 12:04

## 2023-04-21 RX ADMIN — FENTANYL CITRATE 100 MCG: 50 INJECTION, SOLUTION INTRAMUSCULAR; INTRAVENOUS at 01:04

## 2023-04-21 RX ADMIN — FENTANYL CITRATE 100 MCG: 50 INJECTION, SOLUTION INTRAMUSCULAR; INTRAVENOUS at 12:04

## 2023-04-21 RX ADMIN — PROPOFOL 160 MG: 10 INJECTION, EMULSION INTRAVENOUS at 12:04

## 2023-04-21 RX ADMIN — GLYCOPYRROLATE 0.2 MG: 0.2 INJECTION INTRAMUSCULAR; INTRAVENOUS at 01:04

## 2023-04-21 RX ADMIN — DEXTROSE AND SODIUM CHLORIDE: 5; 450 INJECTION, SOLUTION INTRAVENOUS at 11:04

## 2023-04-21 RX ADMIN — FAMOTIDINE 20 MG: 20 TABLET, FILM COATED ORAL at 11:04

## 2023-04-21 RX ADMIN — CEFAZOLIN 2 G: 2 INJECTION, POWDER, FOR SOLUTION INTRAMUSCULAR; INTRAVENOUS at 12:04

## 2023-04-21 RX ADMIN — GLYCOPYRROLATE 0.2 MG: 0.2 INJECTION, SOLUTION INTRAMUSCULAR; INTRAVITREAL at 11:04

## 2023-04-21 RX ADMIN — NEOSTIGMINE METHYLSULFATE 3 MG: 0.5 INJECTION INTRAVENOUS at 01:04

## 2023-04-21 RX ADMIN — MIDAZOLAM HYDROCHLORIDE 2 MG: 1 INJECTION, SOLUTION INTRAMUSCULAR; INTRAVENOUS at 12:04

## 2023-04-21 RX ADMIN — SODIUM CHLORIDE, POTASSIUM CHLORIDE, SODIUM LACTATE AND CALCIUM CHLORIDE: 600; 310; 30; 20 INJECTION, SOLUTION INTRAVENOUS at 01:04

## 2023-04-21 NOTE — PLAN OF CARE
WHILE EXPLAINING WOUND CARE TO PT SHE EXPRESSED CONCERN THAT SHE WOULD NOT BE ABLE TO PERFORM THE WOUND CARE DUE TO THE LOCATION OF THE INCISION. PT ALSO STATES THAT SHE DOES NOT HAVE FAMILY THAT WOULD BE ABLE TO PERFORM WOUND CARE AND CARE FOR THE DRAIN. DR RAMACHANDRAN NOTIFIED AND HE STATES HOME HEALTH MAY BE ORDERED FOR PT.

## 2023-04-21 NOTE — ANESTHESIA PROCEDURE NOTES
Intubation    Date/Time: 4/21/2023 12:29 PM  Performed by: Sagar Zhang CRNA  Authorized by: Sagar Zhang CRNA     Intubation:     Induction:  Intravenous    Intubated:  Postinduction    Mask Ventilation:  Easy mask    Attempts:  1    Attempted By:  CRNA    Method of Intubation:  Direct    Blade:  James 3    Laryngeal View Grade: Grade I - full view of cords      Difficult Airway Encountered?: No      Complications:  None    Airway Device:  Oral endotracheal tube    Airway Device Size:  7.0    Style/Cuff Inflation:  Cuffed    Tube secured:  20    Secured at:  The lips    Placement Verified By:  Capnometry    Complicating Factors:  None    Findings Post-Intubation:  BS equal bilateral and atraumatic/condition of teeth unchanged

## 2023-04-21 NOTE — PLAN OF CARE
CM consulted for [facility] s/p lumpectomy with FABIOLA Drain.  Jocelyn called and confirmed that [facility] would be able to see patient for care.  Jocelyn called [facility] that stated they had reached their quota for [employer].  I discussed with patient the prospect of HHC.  Patient agreeable to HHC.    Patient's chart faxed to [facility] for review.  Dr.Ben Mccarthy at bedside doing FABIOLA Drain care education.

## 2023-04-21 NOTE — ANESTHESIA POSTPROCEDURE EVALUATION
Anesthesia Post Evaluation    Patient: Nichole Lopez    Procedure(s) Performed: Procedure(s) (LRB):  LUMPECTOMY, BREAST (Left)    Final Anesthesia Type: general      Patient location during evaluation: PACU  Patient participation: Yes- Able to Participate  Level of consciousness: awake and alert, awake and oriented  Post-procedure vital signs: reviewed and stable  Pain management: adequate  Airway patency: patent    PONV status at discharge: No PONV  Anesthetic complications: no      Cardiovascular status: blood pressure returned to baseline  Respiratory status: unassisted, room air and spontaneous ventilation  Hydration status: euvolemic  Follow-up not needed.          Vitals Value Taken Time   BP 97/64 04/21/23 1407   Temp 97.5 04/21/23 1408   Pulse 111 04/21/23 1408   Resp 22 04/21/23 1408   SpO2 93 % 04/21/23 1408   Vitals shown include unvalidated device data.      No case tracking events are documented in the log.      Pain/Johnny Score: No data recorded

## 2023-04-21 NOTE — PLAN OF CARE
PT AWAKE AND ALERT. VSS, RESP EVEN AND UNLABORED. NAD NOTED AT THIS TIME. GLASS OF WATER GIVEN TO PT AND PT TOLERATING WATER WELL. WILL CONTINUE TO MONITOR PT.

## 2023-04-21 NOTE — DISCHARGE SUMMARY
[facility]  Short Stay    Procedure(s) (LRB):  LUMPECTOMY, BREAST (Left)      OUTCOME: Patient tolerated treatment/procedure well without complication and is now ready for discharge.    DISPOSITION: Home or Self Care    FINAL DIAGNOSIS:  Infected hematoma 10 cm completely excised with lumpectomy    FOLLOWUP: In clinic 1 week    DISCHARGE INSTRUCTIONS:  No discharge procedures on file.     TIME SPENT ON DISCHARGE:  5 minutes

## 2023-04-21 NOTE — OP NOTE
[facility]  Operative Note      Date of Procedure: 4/21/2023     Procedure: Procedure(s) (LRB):  LUMPECTOMY, BREAST (Left)     Surgeon(s) and Role:     * David Mccarthy MD - Primary    Assisting Surgeon: None    Pre-Operative Diagnosis: Breast abscess [N61.1]    Post-Operative Diagnosis: Post-Op Diagnosis Codes:     * Breast abscess [N61.1]    Anesthesia: General    Operative Findings (including complications, if any):  42-year-old  female with a history of neuropathy of the, hypertension, schizophrenia on Risperdal, morbidly obese at 5 ft 7 300 lb.  Presents with a left breast mass that on Sotero-Cut needle biopsy was consistent with fibrosis and inflammatory cells and giant possible abscess.  The mass on CT scan noted on 03/05/2023 was 6 cm in diameter appeared to be consistent with an abscess or infection.  Patient underwent it was 10 cm on ultrasound on 04/11/2022 mammogram was benign.  Patient was consented for drainage or possible excision of the mass area with a lumpectomy.  Patient was brought to the operating room supine position general anesthetic prepped and draped in a sterile fashion     Incision was made over the breast mass and upper and lower flaps were created the mass itself was removed in its entirety was filled with what appeared to be old hematoma that was infected that was evacuated and cultured aerobic anaerobic Gram stain were sent.  I sent the mass in its entirety for histopathology to make sure that there was no potential malignancy my suspicion based on morphology is that it is benign liquified infected hematoma.    The deep layer was closed with 3-0 Vicryl skin was closed with 4 plain gut suture a Penrose drain was placed in the deep space to allow for drainage of any bloody effluent.  Bovie cautery with LigaSure and 15 blade scalpel were used for the excision of the mass itself.        Description of Technical Procedures:  Noted above        Significant Surgical Tasks Conducted by the Assistant(s), if Applicable:  None       Estimated Blood Loss (EBL): 450 mL           Implants: * No implants in log *    Specimens:   Specimen (24h ago, onward)       Start     Ordered    04/21/23 1320  Specimen to Pathology General Surgery  Once        References:    Click here for ordering Quick Tip   Question Answer Comment   Procedure Type: General Surgery    Specimen Source Breast, Left SUSPECTED INFECTED LEFT BREAST HEMATOMA   Clinical Information: LEFT BREAST LUMPECTOMY    Release to patient Immediate        04/21/23 1320    04/21/23 1318  Specimen to Pathology  RELEASE UPON ORDERING        References:    Click here for ordering Quick Tip   Question:  Release to patient  Answer:  Immediate    04/21/23 1318                            Condition: Good    Disposition: PACU - hemodynamically stable.    Attestation: I was present and scrubbed for the entire procedure.    Discharge Note    OUTCOME: Patient tolerated treatment/procedure well without complication and is now ready for discharge.      DISPOSITION: Home or Self Care    FINAL DIAGNOSIS:  Left breast mass consistent with a liquified hematoma with possible infection pathology pending    FOLLOWUP: In clinic 1 week    DISCHARGE INSTRUCTIONS:  No discharge procedures on file.

## 2023-04-21 NOTE — PLAN OF CARE
CONTACTED CASE MANAGEMENT TO POSSIBLY GET PT SET UP WITH HOME HEALTH. CHERRIE WITH CASE MANAGEMENT AT BEDSIDE SPEAKING WITH PT.

## 2023-04-22 LAB
GRAM STN SPEC: NORMAL
GRAM STN SPEC: NORMAL

## 2023-04-24 LAB — BACTERIA SPEC ANAEROBE CULT: NORMAL

## 2023-04-26 LAB — BACTERIA FLD CULT: NORMAL

## 2023-09-02 ENCOUNTER — HOSPITAL ENCOUNTER (INPATIENT)
Facility: HOSPITAL | Age: 43
LOS: 3 days | Discharge: HOME | DRG: 189 | End: 2023-09-05
Attending: STUDENT IN AN ORGANIZED HEALTH CARE EDUCATION/TRAINING PROGRAM | Admitting: INTERNAL MEDICINE
Payer: MEDICARE

## 2023-09-02 DIAGNOSIS — I50.9 CHF (CONGESTIVE HEART FAILURE): ICD-10-CM

## 2023-09-02 DIAGNOSIS — R06.02 SHORTNESS OF BREATH: ICD-10-CM

## 2023-09-02 DIAGNOSIS — J18.9 PNEUMONIA OF BOTH LUNGS DUE TO INFECTIOUS ORGANISM, UNSPECIFIED PART OF LUNG: ICD-10-CM

## 2023-09-02 DIAGNOSIS — J18.9 MULTIFOCAL PNEUMONIA: ICD-10-CM

## 2023-09-02 DIAGNOSIS — M79.89 RIGHT LEG SWELLING: ICD-10-CM

## 2023-09-02 DIAGNOSIS — J96.01 ACUTE HYPOXEMIC RESPIRATORY FAILURE: Primary | ICD-10-CM

## 2023-09-02 LAB
ALBUMIN SERPL-MCNC: 3.3 G/DL (ref 3.5–5)
ALBUMIN/GLOB SERPL: 0.8 RATIO (ref 1.1–2)
ALLENS TEST: ABNORMAL
ALP SERPL-CCNC: 113 UNIT/L (ref 40–150)
ALT SERPL-CCNC: 10 UNIT/L (ref 0–55)
APPEARANCE UR: CLEAR
APTT PPP: 30.2 SECONDS (ref 24.6–37.2)
AST SERPL-CCNC: 13 UNIT/L (ref 5–34)
B PERT.PT PRMT NPH QL NAA+NON-PROBE: NOT DETECTED
B-HCG SERPL QL: NEGATIVE
BACTERIA #/AREA URNS AUTO: ABNORMAL /HPF
BASOPHILS # BLD AUTO: 0.02 X10(3)/MCL
BASOPHILS NFR BLD AUTO: 0.2 %
BILIRUB SERPL-MCNC: 0.6 MG/DL
BILIRUB UR QL STRIP.AUTO: NEGATIVE
BNP BLD-MCNC: 61.4 PG/ML
BUN SERPL-MCNC: 5 MG/DL (ref 7–18.7)
C PNEUM DNA NPH QL NAA+NON-PROBE: NOT DETECTED
CALCIUM SERPL-MCNC: 8.5 MG/DL (ref 8.4–10.2)
CHLORIDE SERPL-SCNC: 100 MMOL/L (ref 98–107)
CO2 SERPL-SCNC: 24 MMOL/L (ref 22–29)
COLOR UR: YELLOW
CREAT SERPL-MCNC: 0.78 MG/DL (ref 0.55–1.02)
D DIMER PPP IA.FEU-MCNC: 2.2 UG/ML FEU (ref 0–0.5)
DELSYS: ABNORMAL
EOSINOPHIL # BLD AUTO: 0.06 X10(3)/MCL (ref 0–0.9)
EOSINOPHIL NFR BLD AUTO: 0.6 %
ERYTHROCYTE [DISTWIDTH] IN BLOOD BY AUTOMATED COUNT: 20.4 % (ref 11.5–17)
FLUAV AG UPPER RESP QL IA.RAPID: NOT DETECTED
FLUBV AG UPPER RESP QL IA.RAPID: NOT DETECTED
GFR SERPLBLD CREATININE-BSD FMLA CKD-EPI: >60 MLS/MIN/1.73/M2
GLOBULIN SER-MCNC: 4.4 GM/DL (ref 2.4–3.5)
GLUCOSE SERPL-MCNC: 97 MG/DL (ref 74–100)
GLUCOSE UR QL STRIP.AUTO: NEGATIVE
HADV DNA NPH QL NAA+NON-PROBE: NOT DETECTED
HCO3 UR-SCNC: 23.9 MMOL/L (ref 24–28)
HCOV 229E RNA NPH QL NAA+NON-PROBE: NOT DETECTED
HCOV HKU1 RNA NPH QL NAA+NON-PROBE: NOT DETECTED
HCOV NL63 RNA NPH QL NAA+NON-PROBE: NOT DETECTED
HCOV OC43 RNA NPH QL NAA+NON-PROBE: NOT DETECTED
HCT VFR BLD AUTO: 28.2 % (ref 37–47)
HGB BLD-MCNC: 8.6 G/DL (ref 12–16)
HMPV RNA NPH QL NAA+NON-PROBE: NOT DETECTED
HPIV1 RNA NPH QL NAA+NON-PROBE: NOT DETECTED
HPIV2 RNA NPH QL NAA+NON-PROBE: NOT DETECTED
HPIV3 RNA NPH QL NAA+NON-PROBE: NOT DETECTED
HPIV4 RNA NPH QL NAA+NON-PROBE: NOT DETECTED
IMM GRANULOCYTES # BLD AUTO: 0.23 X10(3)/MCL (ref 0–0.04)
IMM GRANULOCYTES NFR BLD AUTO: 2.3 %
INR PPP: 1.1
KETONES UR QL STRIP.AUTO: NEGATIVE
LACTATE SERPL-SCNC: 1.7 MMOL/L (ref 0.5–2.2)
LACTATE SERPL-SCNC: 3 MMOL/L (ref 0.5–2.2)
LACTATE SERPL-SCNC: 3.6 MMOL/L (ref 0.5–2.2)
LEUKOCYTE ESTERASE UR QL STRIP.AUTO: ABNORMAL
LYMPHOCYTES # BLD AUTO: 1.35 X10(3)/MCL (ref 0.6–4.6)
LYMPHOCYTES NFR BLD AUTO: 13.6 %
M PNEUMO DNA NPH QL NAA+NON-PROBE: NOT DETECTED
MAGNESIUM SERPL-MCNC: 1.9 MG/DL (ref 1.6–2.6)
MCH RBC QN AUTO: 32.7 PG (ref 27–31)
MCHC RBC AUTO-ENTMCNC: 30.5 G/DL (ref 33–36)
MCV RBC AUTO: 107.2 FL (ref 80–94)
MONOCYTES # BLD AUTO: 0.36 X10(3)/MCL (ref 0.1–1.3)
MONOCYTES NFR BLD AUTO: 3.6 %
NEUTROPHILS # BLD AUTO: 7.92 X10(3)/MCL (ref 2.1–9.2)
NEUTROPHILS NFR BLD AUTO: 79.7 %
NITRITE UR QL STRIP.AUTO: POSITIVE
PCO2 BLDA: 41.7 MMHG (ref 35–45)
PH SMN: 7.37 [PH] (ref 7.35–7.45)
PH UR STRIP.AUTO: 6 [PH]
PLATELET # BLD AUTO: 388 X10(3)/MCL (ref 130–400)
PMV BLD AUTO: 9.8 FL (ref 7.4–10.4)
PO2 BLDA: 104 MMHG (ref 80–100)
POC BE: -1 MMOL/L
POC SATURATED O2: 98 % (ref 95–100)
POC TCO2: 25 MMOL/L (ref 23–27)
POTASSIUM SERPL-SCNC: 3.5 MMOL/L (ref 3.5–5.1)
PROT SERPL-MCNC: 7.7 GM/DL (ref 6.4–8.3)
PROT UR QL STRIP.AUTO: ABNORMAL
PROTHROMBIN TIME: 14.6 SECONDS (ref 12.5–14.5)
RBC # BLD AUTO: 2.63 X10(6)/MCL (ref 4.2–5.4)
RBC #/AREA URNS AUTO: ABNORMAL /HPF
RBC UR QL AUTO: NEGATIVE
RSV RNA NPH QL NAA+NON-PROBE: NOT DETECTED
RV+EV RNA NPH QL NAA+NON-PROBE: NOT DETECTED
SAMPLE: ABNORMAL
SARS-COV-2 RNA RESP QL NAA+PROBE: NOT DETECTED
SITE: ABNORMAL
SODIUM SERPL-SCNC: 137 MMOL/L (ref 136–145)
SP GR UR STRIP.AUTO: <=1.005 (ref 1–1.03)
SQUAMOUS #/AREA URNS AUTO: ABNORMAL /HPF
TROPONIN I SERPL-MCNC: <0.01 NG/ML (ref 0–0.04)
TSH SERPL-ACNC: 1.86 UIU/ML (ref 0.35–4.94)
UROBILINOGEN UR STRIP-ACNC: 1
WBC # SPEC AUTO: 9.94 X10(3)/MCL (ref 4.5–11.5)
WBC #/AREA URNS AUTO: ABNORMAL /HPF

## 2023-09-02 PROCEDURE — 21400001 HC TELEMETRY ROOM

## 2023-09-02 PROCEDURE — 82803 BLOOD GASES ANY COMBINATION: CPT

## 2023-09-02 PROCEDURE — 93005 ELECTROCARDIOGRAM TRACING: CPT

## 2023-09-02 PROCEDURE — 81001 URINALYSIS AUTO W/SCOPE: CPT | Performed by: STUDENT IN AN ORGANIZED HEALTH CARE EDUCATION/TRAINING PROGRAM

## 2023-09-02 PROCEDURE — 87798 DETECT AGENT NOS DNA AMP: CPT | Performed by: STUDENT IN AN ORGANIZED HEALTH CARE EDUCATION/TRAINING PROGRAM

## 2023-09-02 PROCEDURE — 63600175 PHARM REV CODE 636 W HCPCS: Performed by: INTERNAL MEDICINE

## 2023-09-02 PROCEDURE — 87633 RESP VIRUS 12-25 TARGETS: CPT | Performed by: STUDENT IN AN ORGANIZED HEALTH CARE EDUCATION/TRAINING PROGRAM

## 2023-09-02 PROCEDURE — 83735 ASSAY OF MAGNESIUM: CPT | Performed by: STUDENT IN AN ORGANIZED HEALTH CARE EDUCATION/TRAINING PROGRAM

## 2023-09-02 PROCEDURE — 25000003 PHARM REV CODE 250: Performed by: INTERNAL MEDICINE

## 2023-09-02 PROCEDURE — 99900035 HC TECH TIME PER 15 MIN (STAT)

## 2023-09-02 PROCEDURE — 84484 ASSAY OF TROPONIN QUANT: CPT | Performed by: STUDENT IN AN ORGANIZED HEALTH CARE EDUCATION/TRAINING PROGRAM

## 2023-09-02 PROCEDURE — 11000001 HC ACUTE MED/SURG PRIVATE ROOM

## 2023-09-02 PROCEDURE — 84443 ASSAY THYROID STIM HORMONE: CPT | Performed by: STUDENT IN AN ORGANIZED HEALTH CARE EDUCATION/TRAINING PROGRAM

## 2023-09-02 PROCEDURE — 0240U COVID/FLU A&B PCR: CPT | Performed by: STUDENT IN AN ORGANIZED HEALTH CARE EDUCATION/TRAINING PROGRAM

## 2023-09-02 PROCEDURE — 94761 N-INVAS EAR/PLS OXIMETRY MLT: CPT

## 2023-09-02 PROCEDURE — 83605 ASSAY OF LACTIC ACID: CPT | Performed by: STUDENT IN AN ORGANIZED HEALTH CARE EDUCATION/TRAINING PROGRAM

## 2023-09-02 PROCEDURE — 93010 ELECTROCARDIOGRAM REPORT: CPT | Mod: ,,, | Performed by: INTERNAL MEDICINE

## 2023-09-02 PROCEDURE — 85379 FIBRIN DEGRADATION QUANT: CPT | Performed by: STUDENT IN AN ORGANIZED HEALTH CARE EDUCATION/TRAINING PROGRAM

## 2023-09-02 PROCEDURE — 85610 PROTHROMBIN TIME: CPT | Performed by: STUDENT IN AN ORGANIZED HEALTH CARE EDUCATION/TRAINING PROGRAM

## 2023-09-02 PROCEDURE — 83880 ASSAY OF NATRIURETIC PEPTIDE: CPT | Performed by: STUDENT IN AN ORGANIZED HEALTH CARE EDUCATION/TRAINING PROGRAM

## 2023-09-02 PROCEDURE — 87040 BLOOD CULTURE FOR BACTERIA: CPT | Performed by: STUDENT IN AN ORGANIZED HEALTH CARE EDUCATION/TRAINING PROGRAM

## 2023-09-02 PROCEDURE — 25000242 PHARM REV CODE 250 ALT 637 W/ HCPCS: Performed by: STUDENT IN AN ORGANIZED HEALTH CARE EDUCATION/TRAINING PROGRAM

## 2023-09-02 PROCEDURE — 27000221 HC OXYGEN, UP TO 24 HOURS

## 2023-09-02 PROCEDURE — A4216 STERILE WATER/SALINE, 10 ML: HCPCS | Performed by: INTERNAL MEDICINE

## 2023-09-02 PROCEDURE — 80053 COMPREHEN METABOLIC PANEL: CPT | Performed by: STUDENT IN AN ORGANIZED HEALTH CARE EDUCATION/TRAINING PROGRAM

## 2023-09-02 PROCEDURE — 94640 AIRWAY INHALATION TREATMENT: CPT

## 2023-09-02 PROCEDURE — 96361 HYDRATE IV INFUSION ADD-ON: CPT

## 2023-09-02 PROCEDURE — 96365 THER/PROPH/DIAG IV INF INIT: CPT

## 2023-09-02 PROCEDURE — 99291 CRITICAL CARE FIRST HOUR: CPT

## 2023-09-02 PROCEDURE — 87070 CULTURE OTHR SPECIMN AEROBIC: CPT | Performed by: STUDENT IN AN ORGANIZED HEALTH CARE EDUCATION/TRAINING PROGRAM

## 2023-09-02 PROCEDURE — 25500020 PHARM REV CODE 255: Performed by: STUDENT IN AN ORGANIZED HEALTH CARE EDUCATION/TRAINING PROGRAM

## 2023-09-02 PROCEDURE — 85025 COMPLETE CBC W/AUTO DIFF WBC: CPT | Performed by: STUDENT IN AN ORGANIZED HEALTH CARE EDUCATION/TRAINING PROGRAM

## 2023-09-02 PROCEDURE — 85730 THROMBOPLASTIN TIME PARTIAL: CPT | Performed by: STUDENT IN AN ORGANIZED HEALTH CARE EDUCATION/TRAINING PROGRAM

## 2023-09-02 PROCEDURE — 81025 URINE PREGNANCY TEST: CPT | Performed by: STUDENT IN AN ORGANIZED HEALTH CARE EDUCATION/TRAINING PROGRAM

## 2023-09-02 PROCEDURE — 25000003 PHARM REV CODE 250: Performed by: STUDENT IN AN ORGANIZED HEALTH CARE EDUCATION/TRAINING PROGRAM

## 2023-09-02 PROCEDURE — 93010 EKG 12-LEAD: ICD-10-PCS | Mod: ,,, | Performed by: INTERNAL MEDICINE

## 2023-09-02 PROCEDURE — 83605 ASSAY OF LACTIC ACID: CPT | Performed by: INTERNAL MEDICINE

## 2023-09-02 PROCEDURE — 84145 PROCALCITONIN (PCT): CPT | Performed by: INTERNAL MEDICINE

## 2023-09-02 PROCEDURE — 63600175 PHARM REV CODE 636 W HCPCS: Performed by: STUDENT IN AN ORGANIZED HEALTH CARE EDUCATION/TRAINING PROGRAM

## 2023-09-02 PROCEDURE — 36600 WITHDRAWAL OF ARTERIAL BLOOD: CPT

## 2023-09-02 RX ORDER — IBUPROFEN 200 MG
1 TABLET ORAL DAILY
Status: DISCONTINUED | OUTPATIENT
Start: 2023-09-03 | End: 2023-09-05 | Stop reason: HOSPADM

## 2023-09-02 RX ORDER — METOPROLOL TARTRATE 25 MG/1
25 TABLET, FILM COATED ORAL 2 TIMES DAILY
Status: DISCONTINUED | OUTPATIENT
Start: 2023-09-02 | End: 2023-09-02

## 2023-09-02 RX ORDER — RISPERIDONE 1 MG/1
2 TABLET ORAL 2 TIMES DAILY
Status: DISCONTINUED | OUTPATIENT
Start: 2023-09-02 | End: 2023-09-02

## 2023-09-02 RX ORDER — FUROSEMIDE 10 MG/ML
40 INJECTION INTRAMUSCULAR; INTRAVENOUS EVERY 12 HOURS
Status: DISCONTINUED | OUTPATIENT
Start: 2023-09-02 | End: 2023-09-02

## 2023-09-02 RX ORDER — ONDANSETRON 2 MG/ML
4 INJECTION INTRAMUSCULAR; INTRAVENOUS EVERY 6 HOURS PRN
Status: DISCONTINUED | OUTPATIENT
Start: 2023-09-02 | End: 2023-09-05 | Stop reason: HOSPADM

## 2023-09-02 RX ORDER — LOSARTAN POTASSIUM 50 MG/1
50 TABLET ORAL DAILY
Status: DISCONTINUED | OUTPATIENT
Start: 2023-09-02 | End: 2023-09-03

## 2023-09-02 RX ORDER — SODIUM CHLORIDE 0.9 % (FLUSH) 0.9 %
10 SYRINGE (ML) INJECTION EVERY 8 HOURS
Status: DISCONTINUED | OUTPATIENT
Start: 2023-09-02 | End: 2023-09-05 | Stop reason: HOSPADM

## 2023-09-02 RX ORDER — IPRATROPIUM BROMIDE AND ALBUTEROL SULFATE 2.5; .5 MG/3ML; MG/3ML
3 SOLUTION RESPIRATORY (INHALATION) EVERY 6 HOURS PRN
Status: DISCONTINUED | OUTPATIENT
Start: 2023-09-02 | End: 2023-09-05 | Stop reason: HOSPADM

## 2023-09-02 RX ORDER — HYDRALAZINE HYDROCHLORIDE 20 MG/ML
10 INJECTION INTRAMUSCULAR; INTRAVENOUS EVERY 6 HOURS PRN
Status: DISCONTINUED | OUTPATIENT
Start: 2023-09-02 | End: 2023-09-02

## 2023-09-02 RX ORDER — FUROSEMIDE 10 MG/ML
40 INJECTION INTRAMUSCULAR; INTRAVENOUS EVERY 8 HOURS
Status: DISCONTINUED | OUTPATIENT
Start: 2023-09-02 | End: 2023-09-03

## 2023-09-02 RX ORDER — GUAIFENESIN 100 MG/5ML
200 SOLUTION ORAL EVERY 4 HOURS PRN
Status: DISCONTINUED | OUTPATIENT
Start: 2023-09-02 | End: 2023-09-05 | Stop reason: HOSPADM

## 2023-09-02 RX ORDER — AMLODIPINE BESYLATE 10 MG/1
10 TABLET ORAL DAILY
Status: DISCONTINUED | OUTPATIENT
Start: 2023-09-02 | End: 2023-09-03

## 2023-09-02 RX ORDER — IPRATROPIUM BROMIDE AND ALBUTEROL SULFATE 2.5; .5 MG/3ML; MG/3ML
3 SOLUTION RESPIRATORY (INHALATION)
Status: COMPLETED | OUTPATIENT
Start: 2023-09-02 | End: 2023-09-02

## 2023-09-02 RX ORDER — ENOXAPARIN SODIUM 100 MG/ML
40 INJECTION SUBCUTANEOUS EVERY 24 HOURS
Status: DISCONTINUED | OUTPATIENT
Start: 2023-09-02 | End: 2023-09-05 | Stop reason: HOSPADM

## 2023-09-02 RX ORDER — RISPERIDONE 1 MG/1
2 TABLET ORAL 2 TIMES DAILY
Status: DISCONTINUED | OUTPATIENT
Start: 2023-09-02 | End: 2023-09-05 | Stop reason: HOSPADM

## 2023-09-02 RX ORDER — AMLODIPINE BESYLATE 10 MG/1
10 TABLET ORAL DAILY
Status: DISCONTINUED | OUTPATIENT
Start: 2023-09-02 | End: 2023-09-02

## 2023-09-02 RX ORDER — LABETALOL HYDROCHLORIDE 5 MG/ML
10 INJECTION, SOLUTION INTRAVENOUS EVERY 6 HOURS PRN
Status: DISCONTINUED | OUTPATIENT
Start: 2023-09-02 | End: 2023-09-05 | Stop reason: HOSPADM

## 2023-09-02 RX ORDER — ACETAMINOPHEN 325 MG/1
650 TABLET ORAL EVERY 8 HOURS PRN
Status: DISCONTINUED | OUTPATIENT
Start: 2023-09-02 | End: 2023-09-05 | Stop reason: HOSPADM

## 2023-09-02 RX ORDER — METOPROLOL TARTRATE 25 MG/1
25 TABLET, FILM COATED ORAL 2 TIMES DAILY
Status: DISCONTINUED | OUTPATIENT
Start: 2023-09-02 | End: 2023-09-05 | Stop reason: HOSPADM

## 2023-09-02 RX ORDER — POTASSIUM CHLORIDE 750 MG/1
10 TABLET, EXTENDED RELEASE ORAL DAILY
Status: DISCONTINUED | OUTPATIENT
Start: 2023-09-02 | End: 2023-09-03

## 2023-09-02 RX ADMIN — POTASSIUM CHLORIDE 10 MEQ: 750 TABLET, FILM COATED, EXTENDED RELEASE ORAL at 04:09

## 2023-09-02 RX ADMIN — IPRATROPIUM BROMIDE AND ALBUTEROL SULFATE 3 ML: .5; 3 SOLUTION RESPIRATORY (INHALATION) at 04:09

## 2023-09-02 RX ADMIN — SODIUM CHLORIDE, PRESERVATIVE FREE 10 ML: 5 INJECTION INTRAVENOUS at 01:09

## 2023-09-02 RX ADMIN — AMLODIPINE BESYLATE 10 MG: 5 TABLET ORAL at 09:09

## 2023-09-02 RX ADMIN — SODIUM CHLORIDE 1848 ML: 9 INJECTION, SOLUTION INTRAVENOUS at 07:09

## 2023-09-02 RX ADMIN — SODIUM CHLORIDE, PRESERVATIVE FREE 10 ML: 5 INJECTION INTRAVENOUS at 10:09

## 2023-09-02 RX ADMIN — LOSARTAN POTASSIUM 50 MG: 50 TABLET, FILM COATED ORAL at 04:09

## 2023-09-02 RX ADMIN — ENOXAPARIN SODIUM 40 MG: 40 INJECTION SUBCUTANEOUS at 04:09

## 2023-09-02 RX ADMIN — PIPERACILLIN AND TAZOBACTAM 4.5 G: 4; .5 INJECTION, POWDER, LYOPHILIZED, FOR SOLUTION INTRAVENOUS; PARENTERAL at 09:09

## 2023-09-02 RX ADMIN — IOPAMIDOL 100 ML: 755 INJECTION, SOLUTION INTRAVENOUS at 04:09

## 2023-09-02 RX ADMIN — METOPROLOL TARTRATE 25 MG: 25 TABLET, FILM COATED ORAL at 09:09

## 2023-09-02 RX ADMIN — PIPERACILLIN AND TAZOBACTAM 4.5 G: 4; .5 INJECTION, POWDER, LYOPHILIZED, FOR SOLUTION INTRAVENOUS; PARENTERAL at 02:09

## 2023-09-02 RX ADMIN — RISPERIDONE 2 MG: 1 TABLET ORAL at 09:09

## 2023-09-02 RX ADMIN — FUROSEMIDE 40 MG: 10 INJECTION, SOLUTION INTRAMUSCULAR; INTRAVENOUS at 09:09

## 2023-09-02 RX ADMIN — PIPERACILLIN AND TAZOBACTAM 4.5 G: 4; .5 INJECTION, POWDER, LYOPHILIZED, FOR SOLUTION INTRAVENOUS; PARENTERAL at 05:09

## 2023-09-02 RX ADMIN — AZITHROMYCIN MONOHYDRATE 500 MG: 500 INJECTION, POWDER, LYOPHILIZED, FOR SOLUTION INTRAVENOUS at 01:09

## 2023-09-02 RX ADMIN — FUROSEMIDE 40 MG: 10 INJECTION, SOLUTION INTRAMUSCULAR; INTRAVENOUS at 02:09

## 2023-09-02 NOTE — PLAN OF CARE
Problem: Adult Inpatient Plan of Care  Goal: Plan of Care Review  Outcome: Ongoing, Progressing  Goal: Patient-Specific Goal (Individualized)  Outcome: Ongoing, Progressing  Goal: Absence of Hospital-Acquired Illness or Injury  Outcome: Ongoing, Progressing  Goal: Optimal Comfort and Wellbeing  Outcome: Ongoing, Progressing  Goal: Readiness for Transition of Care  Outcome: Ongoing, Progressing     Problem: Bariatric Environmental Safety  Goal: Safety Maintained with Care  Outcome: Ongoing, Progressing     Problem: Behavioral Health Comorbidity  Goal: Maintenance of Behavioral Health Symptom Control  Outcome: Ongoing, Progressing     Problem: Hypertension Comorbidity  Goal: Blood Pressure in Desired Range  Outcome: Ongoing, Progressing     Problem: Pain Acute  Goal: Acceptable Pain Control and Functional Ability  Outcome: Ongoing, Progressing     Problem: Fatigue  Goal: Improved Activity Tolerance  Outcome: Ongoing, Progressing     Problem: Fall Injury Risk  Goal: Absence of Fall and Fall-Related Injury  Outcome: Ongoing, Progressing     Problem: Infection  Goal: Absence of Infection Signs and Symptoms  Outcome: Ongoing, Progressing     Problem: Balance Impairment (Functional Deficit)  Goal: Improved Balance and Postural Control  Outcome: Ongoing, Progressing     Problem: Cognitive Impairment (Functional Deficit)  Goal: Optimal Functional [place]  Outcome: Ongoing, Progressing     Problem: Coordination Impairment (Functional Deficit)  Goal: Optimal Coordination  Outcome: Ongoing, Progressing     Problem: Muscle Strength Impairment (Functional Deficit)  Goal: Improved Muscle Strength  Outcome: Ongoing, Progressing     Problem: Muscle Tone Impairment (Functional Deficit)  Goal: Improved Muscle Tone  Outcome: Ongoing, Progressing     Problem: Range of Motion Impairment (Functional Deficit)  Goal: Optimal Range of Motion  Outcome: Ongoing, Progressing     Problem: Sensory Impairment (Functional Deficit)  Goal:  Compensation for Sensory Deficit  Outcome: Ongoing, Progressing     Problem: Fluid Imbalance (Pneumonia)  Goal: Fluid Balance  Outcome: Ongoing, Progressing     Problem: Infection (Pneumonia)  Goal: Resolution of Infection Signs and Symptoms  Outcome: Ongoing, Progressing     Problem: Respiratory Compromise (Pneumonia)  Goal: Effective Oxygenation and Ventilation  Outcome: Ongoing, Progressing

## 2023-09-02 NOTE — H&P
dChief Complaint; shortness of breath for the past few weeks    HPI:   History was essentially by the patient and the patient's sister who was present at the time of my evaluation.  Patient is a 42 y.o. morbidly obese female with a medical history of hypertension and psychiatric disorder presents to the ER on account of progressive shortness of breath over the past.    Patient has been at usual state of health until about few weeks ago when she developed progressive shortness of breath associated with cough productive of whitish sputum.  Also admits to easy fatigability.  With mild bilateral lower extremity swelling especially on the right lower extremities.  She denies any chest pain or palpitation..  Occasional nausea and vomiting.  No fever.  She decided to come to the ER for further evaluation.    At the ER, she was evaluated, laboratory testing at the ER reviewed by me.  Chest x-ray shows evidence of pulmonary edema.  CT of the chest shows evidence of pulmonary edema.  And negative for pulmonary embolism.  COVID-19 testing negative.  Venous Doppler ultrasound of the lower extremities negative for DVT.  Patient was placed on high-flow nasal cannula oxygen therapy.  Currently on 4 L of nasal cannula oxygen therapy.  ABG results shows evidence of acute hypoxic respiratory failure.    PCP Wade Smith NP MD        Past Medical History:   Diagnosis Date    Hypertension     Mental health disorder         Past Surgical History:   Procedure Laterality Date    BILATERAL TUBAL LIGATION Bilateral     BREAST BIOPSY Left 3/6/2023    Procedure: BIOPSY, BREAST;  Surgeon: David Mccarthy MD;  Location: [facility] OR;  Service: General;  Laterality: Left;     SECTION      LUMPECTOMY, BREAST Left 2023    Procedure: LUMPECTOMY, BREAST;  Surgeon: David Mccarthy MD;  Location: [facility] OR;  Service: General;  Laterality: Left;  **have ligasure available**    THYROID SURGERY          Medications Prior to Admission    Medication Sig Dispense Refill Last Dose    amLODIPine (NORVASC) 10 MG tablet Take 10 mg by mouth once daily.       metoprolol tartrate (LOPRESSOR) 25 MG tablet Take 25 mg by mouth 2 (two) times daily.       risperiDONE (RISPERDAL) 2 MG tablet Take 2 mg by mouth 2 (two) times daily.       [DISCONTINUED] traMADoL (ULTRAM) 50 mg tablet Take 1 tablet (50 mg total) by mouth every 6 (six) hours as needed for Pain. 12 tablet 0        Review of patient's allergies indicates:  No Known Allergies     Social History     Tobacco Use    Smoking status: Every Day     Types: Cigarettes     Passive exposure: Current    Smokeless tobacco: Never   Substance Use Topics    Alcohol use: Yes     Comment: socially        Family History   Problem Relation Age of Onset    Hypertension Mother     Heart attack Mother     Hypertension Father        Review of Systems  Review of Systems   Constitutional: Negative for fever.   HEENT: Negative for drooling, ear pain, facial swelling and nosebleeds.    Eyes: Negative for discharge and visual disturbance.   Respiratory; positive for shortness of breath and cough.    Cardiovascular: negative for chest pain or SOB  Gastrointestinal: Negative for anal bleeding and rectal pain. Negative Nausea or Vomiting.  Genitourinary: Negative for decreased urine volume and dysuria  Musculoskeletal: Negative for neck pain.   Skin: Negative for rash.   Neurological: negative for numbness, negative for weakness,negative for seizures and facial asymmetry.              Objective:     I/O this shift:  In: 97.7 [IV Piggyback:97.7]  Out: -     BP (!) 164/105   Pulse 97   Temp 98.8 °F (37.1 °C)   Resp 18   Wt 132.9 kg (293 lb)   SpO2 98%   BMI 45.89 kg/m²     General Appearance:    Awake, alert, not in acute distress   HEENT:   atraumatic, PERRL, EOM intact, conjuctiva pink, sclera anicteric, oropharynx moist, no lesions noted   Neck:    Supple, no JVD, no carotid bruits, no lymphadenopathy or thyromegaly noted      "  Pulmonary:   Coarse to auscultation bilaterally, reduced breath sounds bileterally.  Diffuse bibasilar crackles   Cardiovascular:    Regular rate and rhythm, S1 S2 normal   Abdomen:     Soft, non-tender,nondistended, bowel sounds active all four quadrants, no masses, no organomegaly   Extremities:  no edema, no cyanosis or clubbing noted       Skin:   No bruises noted.       Neurologic: Alert, awake, oriented x 3, moves all extremities.                 Data Review :   Labs:    CBC:   Lab Results   Component Value Date    WBC 9.94 09/02/2023    WBC 5.5 03/30/2021    RBC 2.63 (L) 09/02/2023    HGB 8.6 (L) 09/02/2023    HGB 14.1 03/30/2021    HCT 28.2 (L) 09/02/2023    HCT 42.4 03/30/2021     09/02/2023     03/30/2021     CMP:   Lab Results   Component Value Date     09/02/2023    K 3.5 09/02/2023    CO2 24 09/02/2023    BUN 5.0 (L) 09/02/2023    CREATININE 0.78 09/02/2023    CALCIUM 8.5 09/02/2023    ALKPHOS 113 09/02/2023    AST 13 09/02/2023    ALT 10 09/02/2023    ALBUMIN 3.3 (L) 09/02/2023    BILITOT 0.6 09/02/2023     Cardiac markers:   Lab Results   Component Value Date    BNP 61.4 09/02/2023       Radiology:  Micro:  No components found for: "BLOODCX", "SPUTUMCX", "URINECX"    Radiology:  [unfilled]        Assessment & Plan:   1. Acute hypoxic respiratory failure secondary to CHF with acute exacerbation, less likely concurrent pneumonia.  Patient is currently on 4 L of nasal cannula oxygen therapy.  Continue diuretic therapy.  Repeat chest x-ray in the morning.  CT of the chest negative for PE but positive for pulmonary edema.    2. CHF with acute exacerbation; unspecified this is systolic or diastolic dysfunction, (BNP was normal, which is not uncommon for obese patient), place patient on IV Lasix and metoprolol.  Monitor input and output.  Follow up 2D echocardiogram.    3. Acute pulmonary edema secondary to CHF with acute exacerbation, less likely related to pneumonia.  We will still " continue current IV antibiotic therapy.  Continue diuretic therapy.  Obtain procalcitonin level.    4. Hypertensive urgency; continue losartan, metoprolol and Lasix.  Monitor blood pressure closely.    5. Morbid obesity; low-calorie diet has been advised.    6. Nicotine addiction; patient has been counseled on need to quit cigarette smoking, counseling was done for about 3 minutes.    7. Psychiatric disorder; continue risperidone.      8. Maintain the patient on DVT prophylaxis by way of Lovenox    Disposition; continue above-stated management.  This note was completed using voice recognition software and transcription errors may occur.      Javier Borrero  9/2/2023  2:17 PM

## 2023-09-02 NOTE — ED PROVIDER NOTES
Encounter Date: 2023       History     Chief Complaint   Patient presents with    Shortness of Breath     SOB over the last few days.      HPI    42-year-old female with a past medical history of hypertension, obesity and psychiatric disorder presents emergency department for shortness of breath.  Patient states that she is been having a cough for the last few weeks.  States she noticed she started having worsening shortness of breath over last 5-7 days.  States she gets extremely short of breath if she tries to walk around or lie down flat.  She also notes that her right leg has been swelling more.  States that she has not came in because she was scared to find out what is going on.  No fevers.    Review of patient's allergies indicates:  No Known Allergies  Past Medical History:   Diagnosis Date    Hypertension     Mental health disorder      Past Surgical History:   Procedure Laterality Date    BILATERAL TUBAL LIGATION Bilateral     BREAST BIOPSY Left 3/6/2023    Procedure: BIOPSY, BREAST;  Surgeon: David Mccarthy MD;  Location: [facility] OR;  Service: General;  Laterality: Left;     SECTION      LUMPECTOMY, BREAST Left 2023    Procedure: LUMPECTOMY, BREAST;  Surgeon: David Mccarthy MD;  Location: [facility] OR;  Service: General;  Laterality: Left;  **have ligasure available**    THYROID SURGERY       Family History   Problem Relation Age of Onset    Hypertension Mother     Heart attack Mother     Hypertension Father      Social History     Tobacco Use    Smoking status: Every Day     Types: Cigarettes     Passive exposure: Current    Smokeless tobacco: Never   Substance Use Topics    Alcohol use: Yes     Comment: socially    Drug use: Never     Comment: NOT QUESTIONED     Review of Systems   Constitutional:  Positive for fatigue. Negative for fever.   Respiratory:  Positive for cough and shortness of breath.    Cardiovascular:  Positive for leg swelling. Negative for chest pain.    Gastrointestinal:  Negative for abdominal pain.   All other systems reviewed and are negative.      Physical Exam     Initial Vitals [09/02/23 0340]   BP Pulse Resp Temp SpO2   139/88 106 18 98.8 °F (37.1 °C) (!) 94 %      MAP       --         Physical Exam    Vitals reviewed.  Constitutional: She appears well-developed and well-nourished. She is Obese . No distress.   Cardiovascular:  Regular rhythm.   Tachycardia present.         Pulmonary/Chest: No respiratory distress. She has decreased breath sounds. She has wheezes in the left upper field.   Abdominal: Abdomen is soft. There is no abdominal tenderness.   Musculoskeletal:         General: Edema (2+ pitting edema right lower extremity) present. No tenderness. Normal range of motion.     Neurological: She is alert and oriented to person, place, and time.   Skin: Skin is warm. Capillary refill takes less than 2 seconds.         ED Course   Critical Care    Date/Time: 9/2/2023 4:42 AM    Performed by: Joesph Madrid MD  Authorized by: Joesph Madrid MD  Direct patient critical care time: 35 minutes  Additional history critical care time: 5 minutes  Ordering / reviewing critical care time: 7 minutes  Documentation critical care time: 3 minutes  Total critical care time (exclusive of procedural time) : 50 minutes  Critical care time was exclusive of separately billable procedures and treating other patients.  Critical care was necessary to treat or prevent imminent or life-threatening deterioration of the following conditions: respiratory failure.  Critical care was time spent personally by me on the following activities: development of treatment plan with patient or surrogate, interpretation of cardiac output measurements, evaluation of patient's response to treatment, examination of patient, obtaining history from patient or surrogate, ordering and performing treatments and interventions, ordering and review of laboratory studies, ordering and review of  radiographic studies, pulse oximetry, re-evaluation of patient's condition and review of old charts.        Orders Placed This Encounter   Procedures    Critical Care    Blood culture #1 **CANNOT BE ORDERED STAT**    Blood culture #2 **CANNOT BE ORDERED STAT**    Respiratory Culture    X-Ray Chest 1 View    US Lower Extremity Veins Right    CTA Chest Non-Coronary (PE Studies)    APTT    Brain natriuretic peptide    CBC auto differential    Comprehensive metabolic panel    D dimer, quantitative    Lactic acid, plasma    Magnesium    Protime-INR    Troponin I    TSH    Urinalysis, Reflex to Urine Culture    CBC with Differential    Lactic Acid, Plasma    Urinalysis, Microscopic    COVID/FLU A&B PCR    Respiratory Panel    Pregnancy, urine rapid    Lactic acid, plasma    POCT ARTERIAL BLOOD GAS Blood Gas    Oxygen Continuous    EKG 12-lead    PFC Facilitated Request    Admit to Inpatient     Medications   sodium chloride 0.9% bolus 1,848 mL 1,848 mL (1,848 mLs Intravenous New Bag 9/2/23 0732)   amLODIPine tablet 10 mg (10 mg Oral Given 9/2/23 0958)   metoprolol tartrate (LOPRESSOR) tablet 25 mg (25 mg Oral Given 9/2/23 0958)   risperiDONE tablet 2 mg (2 mg Oral Given 9/2/23 0958)   albuterol-ipratropium 2.5 mg-0.5 mg/3 mL nebulizer solution 3 mL (3 mLs Nebulization Given 9/2/23 9635)   iopamidoL (ISOVUE-370) injection 100 mL (100 mLs Intravenous Given 9/2/23 4939)   piperacillin-tazobactam (ZOSYN) 4.5 g in dextrose 5 % in water (D5W) 100 mL IVPB (MB+) (0 g Intravenous Stopped 9/2/23 0559)     Admission on 09/02/2023   Component Date Value Ref Range Status    PTT 09/02/2023 30.2  24.6 - 37.2 seconds Final    Natriuretic Peptide 09/02/2023 61.4  <=100.0 pg/mL Final    Sodium Level 09/02/2023 137  136 - 145 mmol/L Final    Potassium Level 09/02/2023 3.5  3.5 - 5.1 mmol/L Final    Chloride 09/02/2023 100  98 - 107 mmol/L Final    Carbon Dioxide 09/02/2023 24  22 - 29 mmol/L Final    Glucose Level 09/02/2023 97  74 - 100  mg/dL Final    Blood Urea Nitrogen 09/02/2023 5.0 (L)  7.0 - 18.7 mg/dL Final    Creatinine 09/02/2023 0.78  0.55 - 1.02 mg/dL Final    Calcium Level Total 09/02/2023 8.5  8.4 - 10.2 mg/dL Final    Protein Total 09/02/2023 7.7  6.4 - 8.3 gm/dL Final    Albumin Level 09/02/2023 3.3 (L)  3.5 - 5.0 g/dL Final    Globulin 09/02/2023 4.4 (H)  2.4 - 3.5 gm/dL Final    Albumin/Globulin Ratio 09/02/2023 0.8 (L)  1.1 - 2.0 ratio Final    Bilirubin Total 09/02/2023 0.6  <=1.5 mg/dL Final    Alkaline Phosphatase 09/02/2023 113  40 - 150 unit/L Final    Alanine Aminotransferase 09/02/2023 10  0 - 55 unit/L Final    Aspartate Aminotransferase 09/02/2023 13  5 - 34 unit/L Final    eGFR 09/02/2023 >60  mls/min/1.73/m2 Final    D-Dimer 09/02/2023 2.20 (H)  0.00 - 0.50 ug/mL FEU Final    Lactic Acid Level 09/02/2023 3.6 (HH)  0.5 - 2.2 mmol/L Final    Magnesium Level 09/02/2023 1.90  1.60 - 2.60 mg/dL Final    PT 09/02/2023 14.6 (H)  12.5 - 14.5 seconds Final    INR 09/02/2023 1.1  <=1.3 Final    Troponin-I 09/02/2023 <0.010  0.000 - 0.045 ng/mL Final    Thyroid Stimulating Hormone 09/02/2023 1.856  0.350 - 4.940 uIU/mL Final    Color, UA 09/02/2023 Yellow  Yellow, Light-Yellow, Dark Yellow, Nidia, Straw Final    Appearance, UA 09/02/2023 Clear  Clear Final    Specific Gravity, UA 09/02/2023 <=1.005  1.005 - 1.030 Final    pH, UA 09/02/2023 6.0  5.0 - 8.5 Final    Protein, UA 09/02/2023 Trace (A)  Negative Final    Glucose, UA 09/02/2023 Negative  Negative, Normal Final    Ketones, UA 09/02/2023 Negative  Negative Final    Blood, UA 09/02/2023 Negative  Negative Final    Bilirubin, UA 09/02/2023 Negative  Negative Final    Urobilinogen, UA 09/02/2023 1.0  0.2, 1.0, Normal Final    Nitrites, UA 09/02/2023 Positive (A)  Negative Final    Leukocyte Esterase, UA 09/02/2023 1+ (A)  Negative Final    WBC 09/02/2023 9.94  4.50 - 11.50 x10(3)/mcL Final    RBC 09/02/2023 2.63 (L)  4.20 - 5.40 x10(6)/mcL Final    Hgb 09/02/2023 8.6 (L)  12.0  - 16.0 g/dL Final    Hct 09/02/2023 28.2 (L)  37.0 - 47.0 % Final    MCV 09/02/2023 107.2 (H)  80.0 - 94.0 fL Final    MCH 09/02/2023 32.7 (H)  27.0 - 31.0 pg Final    MCHC 09/02/2023 30.5 (L)  33.0 - 36.0 g/dL Final    RDW 09/02/2023 20.4 (H)  11.5 - 17.0 % Final    Platelet 09/02/2023 388  130 - 400 x10(3)/mcL Final    MPV 09/02/2023 9.8  7.4 - 10.4 fL Final    Neut % 09/02/2023 79.7  % Final    Lymph % 09/02/2023 13.6  % Final    Mono % 09/02/2023 3.6  % Final    Eos % 09/02/2023 0.6  % Final    Basophil % 09/02/2023 0.2  % Final    Lymph # 09/02/2023 1.35  0.6 - 4.6 x10(3)/mcL Final    Neut # 09/02/2023 7.92  2.1 - 9.2 x10(3)/mcL Final    Mono # 09/02/2023 0.36  0.1 - 1.3 x10(3)/mcL Final    Eos # 09/02/2023 0.06  0 - 0.9 x10(3)/mcL Final    Baso # 09/02/2023 0.02  <=0.2 x10(3)/mcL Final    IG# 09/02/2023 0.23 (H)  0 - 0.04 x10(3)/mcL Final    IG% 09/02/2023 2.3  % Final    Lactic Acid Level 09/02/2023 3.0 (H)  0.5 - 2.2 mmol/L Final    POC PH 09/02/2023 7.367  7.35 - 7.45 Final    POC PCO2 09/02/2023 41.7  35 - 45 mmHg Final    POC PO2 09/02/2023 104 (H)  80 - 100 mmHg Final    POC HCO3 09/02/2023 23.9 (L)  24 - 28 mmol/L Final    POC BE 09/02/2023 -1  -2 to 2 mmol/L Final    POC SATURATED O2 09/02/2023 98  95 - 100 % Final    POC TCO2 09/02/2023 25  23 - 27 mmol/L Final    Sample 09/02/2023 ARTERIAL   Final    Site 09/02/2023 RR   Final    Allens Test 09/02/2023 Pass   Final    DelSys 09/02/2023 Aero Mask   Final    Bacteria, UA 09/02/2023 Trace (A)  None Seen, Rare, Occasional /HPF Final    RBC, UA 09/02/2023 None Seen  None Seen, 0-2, 3-5, 0-5 /HPF Final    WBC, UA 09/02/2023 6-10 (A)  None Seen, 0-2, 3-5, 0-5 /HPF Final    Squamous Epithelial Cells, UA 09/02/2023 Few (A)  None Seen, Rare, Occasional, Occ /HPF Final    Influenza A PCR 09/02/2023 Not Detected  Not Detected Final    Influenza B PCR 09/02/2023 Not Detected  Not Detected Final    SARS-CoV-2 PCR 09/02/2023 Not Detected  Not Detected, Negative,  Invalid Final    Beta hCG Qualitative, Urine 09/02/2023 Negative  Negative Final    Lactic Acid Level 09/02/2023 1.7  0.5 - 2.2 mmol/L Final       Labs Reviewed   COMPREHENSIVE METABOLIC PANEL - Abnormal; Notable for the following components:       Result Value    Blood Urea Nitrogen 5.0 (*)     Albumin Level 3.3 (*)     Globulin 4.4 (*)     Albumin/Globulin Ratio 0.8 (*)     All other components within normal limits   D DIMER, QUANTITATIVE - Abnormal; Notable for the following components:    D-Dimer 2.20 (*)     All other components within normal limits   LACTIC ACID, PLASMA - Abnormal; Notable for the following components:    Lactic Acid Level 3.6 (*)     All other components within normal limits   PROTIME-INR - Abnormal; Notable for the following components:    PT 14.6 (*)     All other components within normal limits   URINALYSIS, REFLEX TO URINE CULTURE - Abnormal; Notable for the following components:    Protein, UA Trace (*)     Nitrites, UA Positive (*)     Leukocyte Esterase, UA 1+ (*)     All other components within normal limits   CBC WITH DIFFERENTIAL - Abnormal; Notable for the following components:    RBC 2.63 (*)     Hgb 8.6 (*)     Hct 28.2 (*)     .2 (*)     MCH 32.7 (*)     MCHC 30.5 (*)     RDW 20.4 (*)     IG# 0.23 (*)     All other components within normal limits   LACTIC ACID, PLASMA - Abnormal; Notable for the following components:    Lactic Acid Level 3.0 (*)     All other components within normal limits   URINALYSIS, MICROSCOPIC - Abnormal; Notable for the following components:    Bacteria, UA Trace (*)     WBC, UA 6-10 (*)     Squamous Epithelial Cells, UA Few (*)     All other components within normal limits   ISTAT PROCEDURE - Abnormal; Notable for the following components:    POC PO2 104 (*)     POC HCO3 23.9 (*)     All other components within normal limits   APTT - Normal   B-TYPE NATRIURETIC PEPTIDE - Normal   MAGNESIUM - Normal   TROPONIN I - Normal   TSH - Normal   COVID/FLU  A&B PCR - Normal    Narrative:     The Xpert Xpress SARS-CoV-2/FLU/RSV plus is a rapid, multiplexed real-time PCR test intended for the simultaneous qualitative detection and differentiation of SARS-CoV-2, Influenza A, Influenza B, and respiratory syncytial virus (RSV) viral RNA in either nasopharyngeal swab or nasal swab specimens.         PREGNANCY TEST, URINE RAPID - Normal   LACTIC ACID, PLASMA - Normal   BLOOD CULTURE [facility]   BLOOD CULTURE [facility]   RESPIRATORY CULTURE ([facility])   CBC W/ AUTO DIFFERENTIAL    Narrative:     The following orders were created for panel order CBC auto differential.  Procedure                               Abnormality         Status                     ---------                               -----------         ------                     CBC with Differential[[id number]]        Abnormal            Final result                 Please view results for these tests on the individual orders.   RESPIRATORY PANEL     EKG Readings: (Independently Interpreted)   Initial Reading: No STEMI. Rhythm: Sinus Tachycardia. Heart Rate: 104. Ectopy: No Ectopy. Conduction: Normal. ST Segments: Normal ST Segments. T Waves: Normal. Clinical Impression: Normal Sinus Rhythm and Sinus Tachycardia     ECG Results              EKG 12-lead (In process)  Result time 09/02/23 05:38:53      In process by Interface, Lab In [facility] (09/02/23 05:38:53)                   Narrative:    Test Reason : R06.02,    Vent. Rate : 104 BPM     Atrial Rate : 104 BPM     P-R Int : 162 ms          QRS Dur : 080 ms      QT Int : 374 ms       P-R-T Axes : 049 001 076 degrees     QTc Int : 491 ms    Sinus tachycardia  Low voltage QRS  Inferior infarct ,age undetermined  Cannot rule out Anterior infarct ,age undetermined  Abnormal ECG  When compared with ECG of 06-MAR-2023 08:57,  Minimal criteria for Anterior infarct are now Present  No significant change was found    Referred By: [email or web address]   SELF           Confirmed By:                                    Imaging Results              US Lower Extremity Veins Right (Final result)  Result time 09/02/23 10:29:31      Final result by Jericho Mercado MD (09/02/23 10:29:31)                   Impression:      No evidence of deep venous thrombosis in the right lower extremity.    Findings are compatible with the preliminary report.      Electronically signed by: Jericho Mercado MD  Date:    09/02/2023  Time:    10:29               Narrative:    EXAMINATION:  US LOWER EXTREMITY VEINS RIGHT    CLINICAL HISTORY:  Right leg swelling    TECHNIQUE:  Duplex and color flow Doppler evaluation and graded compression of the right lower extremity veins was performed.    COMPARISON:  None    FINDINGS:  The common femoral, femoral, popliteal, peroneal, posterior tibial, and anterior tibial veins are patent with normal compressibility.  The veins demonstrate normal phasic flow and augmentation response.                        Preliminary result by Jericho Mercado MD (09/02/23 05:08:48)                   Narrative:    START OF REPORT:  Technique: Real time grey scale and color doppler right lower extremity ultrasound was performed.    Comparison: None.    Clinical History: Rt lower ext swelling. tech impression final image.    Findings:  Right leg: The right leg shows normal compressiblity and augmentation and flow of the common femoral, proximal, mid and distal femoral, popliteal, anterior tibial, posterior tibial and the right common femoral veins. The right greater saphenous vein is patent and compressible at the groin. There is flow and augmentation of the right greater saphenous vein.      Impression:  1. No ultrasound evidence of right lower extremity DVT.                                         CTA Chest Non-Coronary (PE Studies) (Preliminary result)  Result time 09/02/23 04:55:54      Preliminary result by Emmett Beltran MD (09/02/23 04:55:54)                   Narrative:    START OF REPORT:  Technique: CT Scan of the  chest was performed with intravenous contrast with direct axial images as well as sagittal and coronal reconstruction images pulmonary embolus protocol.    Dosage Information: Automated Exposure Control was utilized 633.48 mGy.cm.    Comparison: None.    Clinical History: SOB; PE suspected.    Findings:  Soft Tissues: Unremarkable.  Axilla: A few prominent lymph nodes are seen in the right axilla.  Neck: The visualized soft tissues of the neck appear unremarkable. The thyroid gland appear unremarkable.  Mediastinum: A few enlarged mediastinal lymph nodes are seen precarinal and subcarinal and bilateral hilar spaces . The largest is in right hilar space and measures 2.7 cm in least dimension. This suggests reactive lymphadenopathy.  Heart: Mild cardiomegaly is seen.  Aorta: Unremarkable appearing aorta. No aortic dissection or aneurysm is seen.  Pulmonary Arteries: No filling defects are seen in the pulmonary arteries to suggest pulmonary embolus to the sensitivity of the study.  Lungs: There is non specific dependent change at the lung bases. There are multifocal patchy ground glass parenchymal opacities with interstitial septal thickening in bilateral lung parenchyma.  Pleura: There are small effusions /atelectasis at the bilateral posterior basal segments.  Bony Structures: The visualized bony structures appear unremarkable.  Abdomen: The visualized upper abdominal organs appear unremarkable.      Impression:  1. No filling defects are seen in the pulmonary arteries to suggest pulmonary embolus to the sensitivity of the study.  2. There are multifocal patchy ground glass parenchymal opacities with interstitial septal thickening in bilateral lung parenchyma. These findings are consistent with either multifocal pneumonia versus pulmonary edema. Correlate with clinical and laboratory findings as regards additional evaluation and follow-up to full resolution as indicated.  3. Details and other findings as discussed  above.                                         X-Ray Chest 1 View (Final result)  Result time 09/02/23 10:21:57      Final result by Jericho Mercado MD (09/02/23 10:21:57)                   Impression:      Findings suggestive of mild interstitial edema      Electronically signed by: Jericho Mercado MD  Date:    09/02/2023  Time:    10:21               Narrative:    EXAMINATION:  XR CHEST 1 VIEW    CLINICAL HISTORY:  Shortness of breath    COMPARISON:  04/17/2023    FINDINGS:  Single view of the chest shows mildly prominent interstitial groundless opacities bilaterally without lobar type consolidation, pneumothorax or large pleural effusion.  Heart is enlarged.                        Wet Read by Joesph Madrid MD (09/02/23 03:56:08, [facility] - Emergency Dept, Emergency Medicine)    Near complete whiteout of lung fields                                     Medications   sodium chloride 0.9% bolus 1,848 mL 1,848 mL (1,848 mLs Intravenous New Bag 9/2/23 0732)   amLODIPine tablet 10 mg (10 mg Oral Given 9/2/23 0958)   metoprolol tartrate (LOPRESSOR) tablet 25 mg (25 mg Oral Given 9/2/23 0958)   risperiDONE tablet 2 mg (2 mg Oral Given 9/2/23 0958)   albuterol-ipratropium 2.5 mg-0.5 mg/3 mL nebulizer solution 3 mL (3 mLs Nebulization Given 9/2/23 0418)   iopamidoL (ISOVUE-370) injection 100 mL (100 mLs Intravenous Given 9/2/23 0449)   piperacillin-tazobactam (ZOSYN) 4.5 g in dextrose 5 % in water (D5W) 100 mL IVPB (MB+) (0 g Intravenous Stopped 9/2/23 0559)     Medical Decision Making  differential diagnosis  PE, pneumonia, CHF, viral syndrome,  as well as multiple other possible etiologies    Patient with significant hypoxia although she satting in the 60s she does not seem overt dyspneic.  She does have swelling to the right leg with tachycardia raising concerns of possible PE.  Mild wheezing will give a DuoNeb.  ABG pending.    A-a Gradient from [employer]  on 9/2/2023      RESULT SUMMARY:  129.1 mm  Hg  A-a Gradient    14.3 mm Hg  Expected A-a Gradient for Age      INPUTS:  Atmospheric pressure -> 760 mm Hg  PaO2 -> 104 mm Hg  FiO2 -> 40 %  PaCO2 -> 41.7 mm Hg  Age (for expected A-a gradient) -> 41 years      Amount and/or Complexity of Data Reviewed  Labs: ordered. Decision-making details documented in ED Course.  Radiology: ordered and independent interpretation performed.    Risk  Prescription drug management.  Decision regarding hospitalization.               ED Course as of 09/02/23 1045   Sat Sep 02, 2023   0413 WBC: 9.94 [BS]   0414 Hemoglobin(!): 8.6 [BS]   0414 Hematocrit(!): 28.2 [BS]   0414 Platelets: 388 [BS]   0432 D-Dimer(!): 2.20  Will order CT PE pending CMP [BS]   0437 Lactate, Ángel(!!): 3.6  Likely secondary to hypoxia [BS]   0437 BNP: 61.4 [BS]   0437 Troponin I: <0.010 [BS]   0438 Magnesium : 1.90 [BS]   0552 CT showing bilateral multifocal pneumonia versus pulmonary edema.  Likely multifocal pneumonia in the setting of patient's clinical picture.  Will treat with Zosyn.  Unfortunately have no beds here will transfer for admission.  She is satting 100% on 4-5 L OxyMask.  No distress [BS]   0553 Transition of care at 6 AM. Dr. Kitchen will assume care and determine appropriate treatment and care of this patient.   [BS]   0647 I am Dr. Kitchen I assumed the care of patient at 6:00 a.m.    42 y.o. female with history of hypertension brought to the emergency room with hypoxia from home.  According to medics her oxygen saturation was in 60s, on room air it goes down to 60s but on oxygen it comes up to even 98% on 4 L OxyMask,    Patient is comfortable at this time sitting in the bed says she is been having cough and shortness of breath for about 4 days    She has swelling on the lower extremity, ultrasound of the lower extremity was negative for DVT got a CT chest done which shows patient has multifocal pneumonia or fluid overload but no pulmonary embolism.    Patient is talking in full sentences  not in any distress, comfortably sitting in the bed  S1-S2 is audible no murmurs  Chest is good bilateral air entry, rales audible bilaterally no rhonchi  Abdomen obese nondistended nontender  Extremities patient has swelling of the right lower extremity    Patient is in the transfer portal to be transferred to floor bed somewhere.  Unfortunately they do not have any beds available anywhere at this time and I have explained this to the patient patient says she is okay even going to [place].  Her son says he would prefer to go to [place].     [GQ]   0721 Patient's BNP is actually normal, and essentially those bilateral patchy infiltrates or pneumonia, I will go ahead and put her on IV fluids and give her slow hydration over the next few hours.  Her lactic acid is elevated she has been given IV antibiotic to cover for pneumonia [GQ]   0993 I am still waiting for them to find a bed for the patient, is time for her usual medication, I will go ahead and give her amlodipine, metoprolol and Risperdal. [GQ]   1039 Bed became available in the hospital here, I talked to Dr. Brorero in his okay with admission. [GQ]      ED Course User Index  [BS] Joesph Madrid MD  [GQ] Bev Kitchen MD                      Clinical Impression:   Final diagnoses:  [R06.02] Shortness of breath  [M79.89] Right leg swelling  [J96.01] Acute hypoxemic respiratory failure (Primary)  [J18.9] Multifocal pneumonia  [J18.9] Pneumonia of both lungs due to infectious organism, unspecified part of lung        ED Disposition Condition    Admit Stable                Bev Kitchen MD  09/02/23 1783

## 2023-09-03 LAB
ALBUMIN SERPL-MCNC: 3.1 G/DL (ref 3.5–5)
ALBUMIN/GLOB SERPL: 0.8 RATIO (ref 1.1–2)
ALP SERPL-CCNC: 99 UNIT/L (ref 40–150)
ALT SERPL-CCNC: 8 UNIT/L (ref 0–55)
AST SERPL-CCNC: 10 UNIT/L (ref 5–34)
BASOPHILS # BLD AUTO: 0.01 X10(3)/MCL
BASOPHILS NFR BLD AUTO: 0.1 %
BILIRUB SERPL-MCNC: 0.9 MG/DL
BUN SERPL-MCNC: 7 MG/DL (ref 7–18.7)
CALCIUM SERPL-MCNC: 8.1 MG/DL (ref 8.4–10.2)
CHLORIDE SERPL-SCNC: 98 MMOL/L (ref 98–107)
CO2 SERPL-SCNC: 32 MMOL/L (ref 22–29)
CREAT SERPL-MCNC: 0.8 MG/DL (ref 0.55–1.02)
EOSINOPHIL # BLD AUTO: 0.07 X10(3)/MCL (ref 0–0.9)
EOSINOPHIL NFR BLD AUTO: 0.8 %
ERYTHROCYTE [DISTWIDTH] IN BLOOD BY AUTOMATED COUNT: 20.4 % (ref 11.5–17)
GFR SERPLBLD CREATININE-BSD FMLA CKD-EPI: >60 MLS/MIN/1.73/M2
GLOBULIN SER-MCNC: 4 GM/DL (ref 2.4–3.5)
GLUCOSE SERPL-MCNC: 90 MG/DL (ref 74–100)
HCT VFR BLD AUTO: 27.6 % (ref 37–47)
HGB BLD-MCNC: 8.4 G/DL (ref 12–16)
IMM GRANULOCYTES # BLD AUTO: 0.08 X10(3)/MCL (ref 0–0.04)
IMM GRANULOCYTES NFR BLD AUTO: 0.9 %
LYMPHOCYTES # BLD AUTO: 1.25 X10(3)/MCL (ref 0.6–4.6)
LYMPHOCYTES NFR BLD AUTO: 13.8 %
MCH RBC QN AUTO: 33.1 PG (ref 27–31)
MCHC RBC AUTO-ENTMCNC: 30.4 G/DL (ref 33–36)
MCV RBC AUTO: 108.7 FL (ref 80–94)
MONOCYTES # BLD AUTO: 0.3 X10(3)/MCL (ref 0.1–1.3)
MONOCYTES NFR BLD AUTO: 3.3 %
NEUTROPHILS # BLD AUTO: 7.34 X10(3)/MCL (ref 2.1–9.2)
NEUTROPHILS NFR BLD AUTO: 81.1 %
PLATELET # BLD AUTO: 339 X10(3)/MCL (ref 130–400)
PMV BLD AUTO: 9.9 FL (ref 7.4–10.4)
POTASSIUM SERPL-SCNC: 3.6 MMOL/L (ref 3.5–5.1)
PROCALCITONIN SERPL-MCNC: 0.03 NG/ML
PROT SERPL-MCNC: 7.1 GM/DL (ref 6.4–8.3)
RBC # BLD AUTO: 2.54 X10(6)/MCL (ref 4.2–5.4)
SODIUM SERPL-SCNC: 139 MMOL/L (ref 136–145)
WBC # SPEC AUTO: 9.05 X10(3)/MCL (ref 4.5–11.5)

## 2023-09-03 PROCEDURE — 99900035 HC TECH TIME PER 15 MIN (STAT)

## 2023-09-03 PROCEDURE — 63600175 PHARM REV CODE 636 W HCPCS: Performed by: INTERNAL MEDICINE

## 2023-09-03 PROCEDURE — 11000001 HC ACUTE MED/SURG PRIVATE ROOM

## 2023-09-03 PROCEDURE — 80053 COMPREHEN METABOLIC PANEL: CPT | Performed by: INTERNAL MEDICINE

## 2023-09-03 PROCEDURE — 27000221 HC OXYGEN, UP TO 24 HOURS

## 2023-09-03 PROCEDURE — 25000003 PHARM REV CODE 250: Performed by: INTERNAL MEDICINE

## 2023-09-03 PROCEDURE — 94761 N-INVAS EAR/PLS OXIMETRY MLT: CPT

## 2023-09-03 PROCEDURE — S4991 NICOTINE PATCH NONLEGEND: HCPCS | Performed by: INTERNAL MEDICINE

## 2023-09-03 PROCEDURE — A4216 STERILE WATER/SALINE, 10 ML: HCPCS | Performed by: INTERNAL MEDICINE

## 2023-09-03 PROCEDURE — 85025 COMPLETE CBC W/AUTO DIFF WBC: CPT | Performed by: INTERNAL MEDICINE

## 2023-09-03 PROCEDURE — 21400001 HC TELEMETRY ROOM

## 2023-09-03 RX ORDER — LOSARTAN POTASSIUM 25 MG/1
25 TABLET ORAL DAILY
Status: DISCONTINUED | OUTPATIENT
Start: 2023-09-04 | End: 2023-09-03

## 2023-09-03 RX ORDER — FUROSEMIDE 10 MG/ML
40 INJECTION INTRAMUSCULAR; INTRAVENOUS 2 TIMES DAILY
Status: DISCONTINUED | OUTPATIENT
Start: 2023-09-03 | End: 2023-09-05 | Stop reason: HOSPADM

## 2023-09-03 RX ORDER — POTASSIUM CHLORIDE 20 MEQ/1
20 TABLET, EXTENDED RELEASE ORAL DAILY
Status: DISCONTINUED | OUTPATIENT
Start: 2023-09-04 | End: 2023-09-05 | Stop reason: HOSPADM

## 2023-09-03 RX ORDER — SODIUM CHLORIDE 9 MG/ML
INJECTION, SOLUTION INTRAVENOUS
Status: DISCONTINUED | OUTPATIENT
Start: 2023-09-03 | End: 2023-09-05 | Stop reason: HOSPADM

## 2023-09-03 RX ADMIN — NICOTINE 1 PATCH: 14 PATCH TRANSDERMAL at 09:09

## 2023-09-03 RX ADMIN — SODIUM CHLORIDE, PRESERVATIVE FREE 10 ML: 5 INJECTION INTRAVENOUS at 05:09

## 2023-09-03 RX ADMIN — ENOXAPARIN SODIUM 40 MG: 40 INJECTION SUBCUTANEOUS at 04:09

## 2023-09-03 RX ADMIN — FUROSEMIDE 40 MG: 10 INJECTION, SOLUTION INTRAMUSCULAR; INTRAVENOUS at 09:09

## 2023-09-03 RX ADMIN — SODIUM CHLORIDE: 9 INJECTION, SOLUTION INTRAVENOUS at 04:09

## 2023-09-03 RX ADMIN — FUROSEMIDE 40 MG: 10 INJECTION, SOLUTION INTRAMUSCULAR; INTRAVENOUS at 05:09

## 2023-09-03 RX ADMIN — POTASSIUM CHLORIDE 10 MEQ: 750 TABLET, FILM COATED, EXTENDED RELEASE ORAL at 09:09

## 2023-09-03 RX ADMIN — RISPERIDONE 2 MG: 1 TABLET ORAL at 09:09

## 2023-09-03 RX ADMIN — SODIUM CHLORIDE, PRESERVATIVE FREE 10 ML: 5 INJECTION INTRAVENOUS at 10:09

## 2023-09-03 RX ADMIN — SODIUM CHLORIDE, PRESERVATIVE FREE 10 ML: 5 INJECTION INTRAVENOUS at 02:09

## 2023-09-03 RX ADMIN — RISPERIDONE 2 MG: 1 TABLET ORAL at 08:09

## 2023-09-03 RX ADMIN — PIPERACILLIN AND TAZOBACTAM 4.5 G: 4; .5 INJECTION, POWDER, LYOPHILIZED, FOR SOLUTION INTRAVENOUS; PARENTERAL at 04:09

## 2023-09-03 RX ADMIN — AZITHROMYCIN MONOHYDRATE 500 MG: 500 INJECTION, POWDER, LYOPHILIZED, FOR SOLUTION INTRAVENOUS at 11:09

## 2023-09-03 NOTE — PLAN OF CARE
Problem: Adult Inpatient Plan of Care  Goal: Plan of Care Review  Outcome: Ongoing, Progressing  Goal: Patient-Specific Goal (Individualized)  Outcome: Ongoing, Progressing  Goal: Absence of Hospital-Acquired Illness or Injury  Outcome: Ongoing, Progressing  Goal: Optimal Comfort and Wellbeing  Outcome: Ongoing, Progressing  Goal: Readiness for Transition of Care  Outcome: Ongoing, Progressing     Problem: Bariatric Environmental Safety  Goal: Safety Maintained with Care  Outcome: Ongoing, Progressing     Problem: Behavioral Health Comorbidity  Goal: Maintenance of Behavioral Health Symptom Control  Outcome: Ongoing, Progressing     Problem: Hypertension Comorbidity  Goal: Blood Pressure in Desired Range  Outcome: Ongoing, Progressing     Problem: Pain Acute  Goal: Acceptable Pain Control and Functional Ability  Outcome: Ongoing, Progressing     Problem: Fatigue  Goal: Improved Activity Tolerance  Outcome: Ongoing, Progressing     Problem: Fall Injury Risk  Goal: Absence of Fall and Fall-Related Injury  Outcome: Ongoing, Progressing     Problem: Infection  Goal: Absence of Infection Signs and Symptoms  Outcome: Ongoing, Progressing     Problem: Balance Impairment (Functional Deficit)  Goal: Improved Balance and Postural Control  Outcome: Ongoing, Progressing     Problem: Cognitive Impairment (Functional Deficit)  Goal: Optimal Functional [place]  Outcome: Ongoing, Progressing     Problem: Coordination Impairment (Functional Deficit)  Goal: Optimal Coordination  Outcome: Ongoing, Progressing     Problem: Muscle Strength Impairment (Functional Deficit)  Goal: Improved Muscle Strength  Outcome: Ongoing, Progressing     Problem: Muscle Tone Impairment (Functional Deficit)  Goal: Improved Muscle Tone  Outcome: Ongoing, Progressing     Problem: Range of Motion Impairment (Functional Deficit)  Goal: Optimal Range of Motion  Outcome: Ongoing, Progressing     Problem: Sensory Impairment (Functional Deficit)  Goal:  Compensation for Sensory Deficit  Outcome: Ongoing, Progressing     Problem: Fluid Imbalance (Pneumonia)  Goal: Fluid Balance  Outcome: Ongoing, Progressing     Problem: Infection (Pneumonia)  Goal: Resolution of Infection Signs and Symptoms  Outcome: Ongoing, Progressing     Problem: Respiratory Compromise (Pneumonia)  Goal: Effective Oxygenation and Ventilation  Outcome: Ongoing, Progressing

## 2023-09-04 PROBLEM — E66.813 CLASS 3 SEVERE OBESITY IN ADULT: Status: ACTIVE | Noted: 2023-09-04

## 2023-09-04 PROBLEM — I10 PRIMARY HYPERTENSION: Status: ACTIVE | Noted: 2023-09-04

## 2023-09-04 PROBLEM — J96.01 ACUTE HYPOXEMIC RESPIRATORY FAILURE: Status: ACTIVE | Noted: 2023-09-04

## 2023-09-04 PROBLEM — J81.0 ACUTE PULMONARY EDEMA: Status: ACTIVE | Noted: 2023-09-04

## 2023-09-04 PROBLEM — E66.01 CLASS 3 SEVERE OBESITY IN ADULT: Status: ACTIVE | Noted: 2023-09-04

## 2023-09-04 LAB
ANION GAP SERPL CALC-SCNC: 12 MEQ/L
BACTERIA SPEC CULT: NORMAL
BUN SERPL-MCNC: 11 MG/DL (ref 7–18.7)
CALCIUM SERPL-MCNC: 8.3 MG/DL (ref 8.4–10.2)
CHLORIDE SERPL-SCNC: 94 MMOL/L (ref 98–107)
CO2 SERPL-SCNC: 31 MMOL/L (ref 22–29)
CREAT SERPL-MCNC: 0.76 MG/DL (ref 0.55–1.02)
CREAT/UREA NIT SERPL: 14
ERYTHROCYTE [DISTWIDTH] IN BLOOD BY AUTOMATED COUNT: 20.1 % (ref 11.5–17)
FERRITIN SERPL-MCNC: 153.16 NG/ML (ref 4.63–204)
FOLATE SERPL-MCNC: <2.2 NG/ML (ref 7–31.4)
GFR SERPLBLD CREATININE-BSD FMLA CKD-EPI: >60 MLS/MIN/1.73/M2
GLUCOSE SERPL-MCNC: 83 MG/DL (ref 74–100)
GRAM STN SPEC: NORMAL
HCT VFR BLD AUTO: 29.2 % (ref 37–47)
HGB BLD-MCNC: 8.8 G/DL (ref 12–16)
MCH RBC QN AUTO: 33.1 PG (ref 27–31)
MCHC RBC AUTO-ENTMCNC: 30.1 G/DL (ref 33–36)
MCV RBC AUTO: 109.8 FL (ref 80–94)
PLATELET # BLD AUTO: 339 X10(3)/MCL (ref 130–400)
PMV BLD AUTO: 10.5 FL (ref 7.4–10.4)
POTASSIUM SERPL-SCNC: 3.4 MMOL/L (ref 3.5–5.1)
RBC # BLD AUTO: 2.66 X10(6)/MCL (ref 4.2–5.4)
SODIUM SERPL-SCNC: 137 MMOL/L (ref 136–145)
VIT B12 SERPL-MCNC: 204 PG/ML (ref 213–816)
WBC # SPEC AUTO: 9.84 X10(3)/MCL (ref 4.5–11.5)

## 2023-09-04 PROCEDURE — 82728 ASSAY OF FERRITIN: CPT | Performed by: INTERNAL MEDICINE

## 2023-09-04 PROCEDURE — 25000003 PHARM REV CODE 250: Performed by: INTERNAL MEDICINE

## 2023-09-04 PROCEDURE — S4991 NICOTINE PATCH NONLEGEND: HCPCS | Performed by: INTERNAL MEDICINE

## 2023-09-04 PROCEDURE — 80048 BASIC METABOLIC PNL TOTAL CA: CPT | Performed by: INTERNAL MEDICINE

## 2023-09-04 PROCEDURE — 21400001 HC TELEMETRY ROOM

## 2023-09-04 PROCEDURE — 63600175 PHARM REV CODE 636 W HCPCS: Performed by: INTERNAL MEDICINE

## 2023-09-04 PROCEDURE — 94761 N-INVAS EAR/PLS OXIMETRY MLT: CPT

## 2023-09-04 PROCEDURE — A4216 STERILE WATER/SALINE, 10 ML: HCPCS | Performed by: INTERNAL MEDICINE

## 2023-09-04 PROCEDURE — 27000221 HC OXYGEN, UP TO 24 HOURS

## 2023-09-04 PROCEDURE — 82607 VITAMIN B-12: CPT | Performed by: INTERNAL MEDICINE

## 2023-09-04 PROCEDURE — 99900035 HC TECH TIME PER 15 MIN (STAT)

## 2023-09-04 PROCEDURE — 85027 COMPLETE CBC AUTOMATED: CPT | Performed by: INTERNAL MEDICINE

## 2023-09-04 PROCEDURE — 82746 ASSAY OF FOLIC ACID SERUM: CPT | Performed by: INTERNAL MEDICINE

## 2023-09-04 RX ADMIN — METOPROLOL TARTRATE 25 MG: 25 TABLET, FILM COATED ORAL at 08:09

## 2023-09-04 RX ADMIN — FUROSEMIDE 40 MG: 10 INJECTION, SOLUTION INTRAMUSCULAR; INTRAVENOUS at 09:09

## 2023-09-04 RX ADMIN — POTASSIUM CHLORIDE 20 MEQ: 1500 TABLET, EXTENDED RELEASE ORAL at 08:09

## 2023-09-04 RX ADMIN — RISPERIDONE 2 MG: 1 TABLET ORAL at 08:09

## 2023-09-04 RX ADMIN — ENOXAPARIN SODIUM 40 MG: 40 INJECTION SUBCUTANEOUS at 04:09

## 2023-09-04 RX ADMIN — SODIUM CHLORIDE, PRESERVATIVE FREE 10 ML: 5 INJECTION INTRAVENOUS at 10:09

## 2023-09-04 RX ADMIN — AZITHROMYCIN MONOHYDRATE 500 MG: 500 INJECTION, POWDER, LYOPHILIZED, FOR SOLUTION INTRAVENOUS at 12:09

## 2023-09-04 RX ADMIN — NICOTINE 1 PATCH: 14 PATCH TRANSDERMAL at 08:09

## 2023-09-04 RX ADMIN — SODIUM CHLORIDE, PRESERVATIVE FREE 10 ML: 5 INJECTION INTRAVENOUS at 06:09

## 2023-09-04 RX ADMIN — FUROSEMIDE 40 MG: 10 INJECTION, SOLUTION INTRAMUSCULAR; INTRAVENOUS at 08:09

## 2023-09-04 NOTE — HPI
Patient is a 42 y.o. morbidly obese female with a medical history of hypertension and psychiatric disorder presents to the ER on account of progressive shortness of breath over the past.    Patient has been at usual state of health until about few weeks ago when she developed progressive shortness of breath associated with cough productive of whitish sputum.  Also admits to easy fatigability.  With mild bilateral lower extremity swelling especially on the right lower extremities.  She denies any chest pain or palpitation..  Occasional nausea and vomiting.  No fever.  She decided to come to the ER for further evaluation.

## 2023-09-04 NOTE — PROGRESS NOTES
[facility] Medicine  Progress Note    Patient Name: Nichole Lopez  MRN: [id number]  Patient Class: IP- Inpatient   Admission Date: 9/2/2023  Length of Stay: 2 days  Attending Physician: Javier Borrero MD  Primary Care Provider: Wade Smith NP        Subjective:     Principal Problem:Acute hypoxemic respiratory failure        HPI:  Patient is a 42 y.o. morbidly obese female with a medical history of hypertension and psychiatric disorder presents to the ER on account of progressive shortness of breath over the past.    Patient has been at usual state of health until about few weeks ago when she developed progressive shortness of breath associated with cough productive of whitish sputum.  Also admits to easy fatigability.  With mild bilateral lower extremity swelling especially on the right lower extremities.  She denies any chest pain or palpitation..  Occasional nausea and vomiting.  No fever.  She decided to come to the ER for further evaluation.        Overview/Hospital Course:  9/4/23-patient is feeling better.  Still waiting for ECHO.  Also weaning O2 as well.  Was on 4 liters this morning an dis currently on 2 liters.  Likely d/c in 1-2 days      Interval History:     Review of Systems   Constitutional:  Positive for activity change.   HENT: Negative.     Eyes: Negative.    Respiratory:  Positive for shortness of breath.    Cardiovascular: Negative.    Gastrointestinal: Negative.    Endocrine: Negative.    Genitourinary: Negative.    Musculoskeletal: Negative.    Skin: Negative.    Allergic/Immunologic: Negative.    Neurological: Negative.    Hematological: Negative.    Psychiatric/Behavioral: Negative.       Objective:     Vital Signs (Most Recent):  Temp: 98.5 °F (36.9 °C) (09/04/23 0343)  Pulse: 102 (09/04/23 0343)  Resp: 20 (09/03/23 1524)  BP: 110/71 (09/04/23 0343)  SpO2: 96 % (09/04/23 1400) Vital Signs (24h Range):  Temp:  [98.1 °F (36.7 °C)-98.6 °F  (37 °C)] 98.5 °F (36.9 °C)  Pulse:  [] 102  Resp:  [20] 20  SpO2:  [92 %-96 %] 96 %  BP: (110-123)/(65-76) 110/71     Weight: 132.9 kg (292 lb 15.9 oz)  Body mass index is 45.89 kg/m².    Intake/Output Summary (Last 24 hours) at 9/4/2023 1510  Last data filed at 9/3/2023 1822  Gross per 24 hour   Intake 747.3 ml   Output --   Net 747.3 ml         Physical Exam  Constitutional:       Appearance: Normal appearance. She is obese.   HENT:      Head: Normocephalic and atraumatic.      Nose: Nose normal.      Mouth/Throat:      Mouth: Mucous membranes are moist.      Pharynx: Oropharynx is clear.   Eyes:      Extraocular Movements: Extraocular movements intact.      Conjunctiva/sclera: Conjunctivae normal.      Pupils: Pupils are equal, round, and reactive to light.   Cardiovascular:      Rate and Rhythm: Normal rate and regular rhythm.      Pulses: Normal pulses.      Heart sounds: Normal heart sounds.   Pulmonary:      Effort: Pulmonary effort is normal.      Breath sounds: Rales present.   Abdominal:      General: Bowel sounds are normal.      Palpations: Abdomen is soft.   Musculoskeletal:         General: Normal range of motion.      Cervical back: Normal range of motion and neck supple.   Skin:     General: Skin is warm and dry.      Capillary Refill: Capillary refill takes 2 to 3 seconds.   Neurological:      General: No focal deficit present.      Mental Status: She is alert and oriented to person, place, and time. Mental status is at baseline.   Psychiatric:         Mood and Affect: Mood normal.         Behavior: Behavior normal.         Thought Content: Thought content normal.         Judgment: Judgment normal.             Significant Labs: All pertinent labs within the past 24 hours have been reviewed.  BMP:   Recent Labs   Lab 09/04/23  0545      K 3.4*   CO2 31*   BUN 11.0   CREATININE 0.76   CALCIUM 8.3*     CBC:   Recent Labs   Lab 09/03/23  0521 09/04/23  0545   WBC 9.05 9.84   HGB 8.4* 8.8*  "  HCT 27.6* 29.2*    339     CMP:   Recent Labs   Lab 09/03/23  0521 09/04/23  0545    137   K 3.6 3.4*   CO2 32* 31*   BUN 7.0 11.0   CREATININE 0.80 0.76   CALCIUM 8.1* 8.3*   ALBUMIN 3.1*  --    BILITOT 0.9  --    ALKPHOS 99  --    AST 10  --    ALT 8  --      Magnesium: No results for input(s): "MG" in the last 48 hours.    Significant Imaging: I have reviewed all pertinent imaging results/findings within the past 24 hours.      Assessment/Plan:      * Acute hypoxemic respiratory failure  Patient with Hypoxic Respiratory failure which is Acute.  she is not on home oxygen. Supplemental oxygen was provided and noted-      .   Signs/symptoms of respiratory failure include- tachypnea, increased work of breathing and respiratory distress. Contributing diagnoses includes - CHF Labs and images were reviewed. Patient Has recent ABG, which has been reviewed. Will treat underlying causes and adjust management of respiratory failure as follows- .    ECHO pending  IV lasix  Wean O2  Follow labs    Class 3 severe obesity in adult  Body mass index is 45.89 kg/m². Morbid obesity complicates all aspects of disease management from diagnostic modalities to treatment. Weight loss encouraged and health benefits explained to patient.         Primary hypertension  Monitor  telemetry      Acute pulmonary edema  ECHO  IV lasix  Follow labs        VTE Risk Mitigation (From admission, onward)         Ordered     enoxaparin injection 40 mg  Daily         09/02/23 1218     IP VTE HIGH RISK PATIENT  Once         09/02/23 1218     Place sequential compression device  Until discontinued         09/02/23 1218              Wean O2  Follow labs  ECHO pending  IV lasix  OOB  ambualte  Discharge Planning   JASMYNE:      Code Status: Full Code   Is the patient medically ready for discharge?:     Reason for patient still in hospital (select all that apply): Patient trending condition, Laboratory test, Treatment and Imaging       "               Shakeel Moser MD  Department of Hospital Medicine   [facility]

## 2023-09-04 NOTE — ASSESSMENT & PLAN NOTE
Body mass index is 45.89 kg/m². Morbid obesity complicates all aspects of disease management from diagnostic modalities to treatment. Weight loss encouraged and health benefits explained to patient.

## 2023-09-04 NOTE — PLAN OF CARE
Problem: Adult Inpatient Plan of Care  Goal: Plan of Care Review  Outcome: Ongoing, Progressing  Goal: Patient-Specific Goal (Individualized)  Outcome: Ongoing, Progressing  Goal: Absence of Hospital-Acquired Illness or Injury  Outcome: Ongoing, Progressing  Goal: Optimal Comfort and Wellbeing  Outcome: Ongoing, Progressing  Goal: Readiness for Transition of Care  Outcome: Ongoing, Progressing     Problem: Bariatric Environmental Safety  Goal: Safety Maintained with Care  Outcome: Ongoing, Progressing     Problem: Behavioral Health Comorbidity  Goal: Maintenance of Behavioral Health Symptom Control  Outcome: Ongoing, Progressing     Problem: Hypertension Comorbidity  Goal: Blood Pressure in Desired Range  Outcome: Ongoing, Progressing     Problem: Pain Acute  Goal: Acceptable Pain Control and Functional Ability  Outcome: Ongoing, Progressing     Problem: Fatigue  Goal: Improved Activity Tolerance  Outcome: Ongoing, Progressing     Problem: Fall Injury Risk  Goal: Absence of Fall and Fall-Related Injury  Outcome: Ongoing, Progressing     Problem: Infection  Goal: Absence of Infection Signs and Symptoms  Outcome: Ongoing, Progressing     Problem: Balance Impairment (Functional Deficit)  Goal: Improved Balance and Postural Control  Outcome: Ongoing, Progressing     Problem: Cognitive Impairment (Functional Deficit)  Goal: Optimal Functional [place]  Outcome: Ongoing, Progressing     Problem: Coordination Impairment (Functional Deficit)  Goal: Optimal Coordination  Outcome: Ongoing, Progressing     Problem: Muscle Strength Impairment (Functional Deficit)  Goal: Improved Muscle Strength  Outcome: Ongoing, Progressing     Problem: Muscle Tone Impairment (Functional Deficit)  Goal: Improved Muscle Tone  Outcome: Ongoing, Progressing     Problem: Range of Motion Impairment (Functional Deficit)  Goal: Optimal Range of Motion  Outcome: Ongoing, Progressing     Problem: Sensory Impairment (Functional Deficit)  Goal:  Compensation for Sensory Deficit  Outcome: Ongoing, Progressing     Problem: Fluid Imbalance (Pneumonia)  Goal: Fluid Balance  Outcome: Ongoing, Progressing     Problem: Infection (Pneumonia)  Goal: Resolution of Infection Signs and Symptoms  Outcome: Ongoing, Progressing     Problem: Respiratory Compromise (Pneumonia)  Goal: Effective Oxygenation and Ventilation  Outcome: Ongoing, Progressing

## 2023-09-04 NOTE — ASSESSMENT & PLAN NOTE
Patient with Hypoxic Respiratory failure which is Acute.  she is not on home oxygen. Supplemental oxygen was provided and noted-      .   Signs/symptoms of respiratory failure include- tachypnea, increased work of breathing and respiratory distress. Contributing diagnoses includes - CHF Labs and images were reviewed. Patient Has recent ABG, which has been reviewed. Will treat underlying causes and adjust management of respiratory failure as follows- .    ECHO pending  IV lasix  Wean O2  Follow labs

## 2023-09-04 NOTE — HOSPITAL COURSE
9/4/23-patient is feeling better.  Still waiting for ECHO.  Also weaning O2 as well.  Was on 4 liters this morning an dis currently on 2 liters.  Likely d/c in 1-2 days

## 2023-09-04 NOTE — SUBJECTIVE & OBJECTIVE
Interval History:     Review of Systems   Constitutional:  Positive for activity change.   HENT: Negative.     Eyes: Negative.    Respiratory:  Positive for shortness of breath.    Cardiovascular: Negative.    Gastrointestinal: Negative.    Endocrine: Negative.    Genitourinary: Negative.    Musculoskeletal: Negative.    Skin: Negative.    Allergic/Immunologic: Negative.    Neurological: Negative.    Hematological: Negative.    Psychiatric/Behavioral: Negative.       Objective:     Vital Signs (Most Recent):  Temp: 98.5 °F (36.9 °C) (09/04/23 0343)  Pulse: 102 (09/04/23 0343)  Resp: 20 (09/03/23 1524)  BP: 110/71 (09/04/23 0343)  SpO2: 96 % (09/04/23 1400) Vital Signs (24h Range):  Temp:  [98.1 °F (36.7 °C)-98.6 °F (37 °C)] 98.5 °F (36.9 °C)  Pulse:  [] 102  Resp:  [20] 20  SpO2:  [92 %-96 %] 96 %  BP: (110-123)/(65-76) 110/71     Weight: 132.9 kg (292 lb 15.9 oz)  Body mass index is 45.89 kg/m².    Intake/Output Summary (Last 24 hours) at 9/4/2023 1510  Last data filed at 9/3/2023 1822  Gross per 24 hour   Intake 747.3 ml   Output --   Net 747.3 ml         Physical Exam  Constitutional:       Appearance: Normal appearance. She is obese.   HENT:      Head: Normocephalic and atraumatic.      Nose: Nose normal.      Mouth/Throat:      Mouth: Mucous membranes are moist.      Pharynx: Oropharynx is clear.   Eyes:      Extraocular Movements: Extraocular movements intact.      Conjunctiva/sclera: Conjunctivae normal.      Pupils: Pupils are equal, round, and reactive to light.   Cardiovascular:      Rate and Rhythm: Normal rate and regular rhythm.      Pulses: Normal pulses.      Heart sounds: Normal heart sounds.   Pulmonary:      Effort: Pulmonary effort is normal.      Breath sounds: Rales present.   Abdominal:      General: Bowel sounds are normal.      Palpations: Abdomen is soft.   Musculoskeletal:         General: Normal range of motion.      Cervical back: Normal range of motion and neck supple.   Skin:      "General: Skin is warm and dry.      Capillary Refill: Capillary refill takes 2 to 3 seconds.   Neurological:      General: No focal deficit present.      Mental Status: She is alert and oriented to person, place, and time. Mental status is at baseline.   Psychiatric:         Mood and Affect: Mood normal.         Behavior: Behavior normal.         Thought Content: Thought content normal.         Judgment: Judgment normal.             Significant Labs: All pertinent labs within the past 24 hours have been reviewed.  BMP:   Recent Labs   Lab 09/04/23  0545      K 3.4*   CO2 31*   BUN 11.0   CREATININE 0.76   CALCIUM 8.3*     CBC:   Recent Labs   Lab 09/03/23 0521 09/04/23 0545   WBC 9.05 9.84   HGB 8.4* 8.8*   HCT 27.6* 29.2*    339     CMP:   Recent Labs   Lab 09/03/23 0521 09/04/23  0545    137   K 3.6 3.4*   CO2 32* 31*   BUN 7.0 11.0   CREATININE 0.80 0.76   CALCIUM 8.1* 8.3*   ALBUMIN 3.1*  --    BILITOT 0.9  --    ALKPHOS 99  --    AST 10  --    ALT 8  --      Magnesium: No results for input(s): "MG" in the last 48 hours.    Significant Imaging: I have reviewed all pertinent imaging results/findings within the past 24 hours.  "

## 2023-09-05 VITALS
DIASTOLIC BLOOD PRESSURE: 78 MMHG | RESPIRATION RATE: 20 BRPM | SYSTOLIC BLOOD PRESSURE: 110 MMHG | WEIGHT: 293 LBS | HEART RATE: 86 BPM | TEMPERATURE: 99 F | BODY MASS INDEX: 45.99 KG/M2 | OXYGEN SATURATION: 94 % | HEIGHT: 67 IN

## 2023-09-05 LAB
ALBUMIN SERPL-MCNC: 3 G/DL (ref 3.5–5)
ALBUMIN/GLOB SERPL: 0.7 RATIO (ref 1.1–2)
ALP SERPL-CCNC: 103 UNIT/L (ref 40–150)
ALT SERPL-CCNC: 10 UNIT/L (ref 0–55)
AST SERPL-CCNC: 46 UNIT/L (ref 5–34)
AV PEAK GRADIENT: 9 MMHG
AV VALVE AREA BY VELOCITY RATIO: 2.54 CM²
AV VELOCITY RATIO: 0.77
BASOPHILS # BLD AUTO: 0.01 X10(3)/MCL
BASOPHILS NFR BLD AUTO: 0.1 %
BILIRUB SERPL-MCNC: 0.8 MG/DL
BNP BLD-MCNC: <10 PG/ML
BSA FOR ECHO PROCEDURE: 2.51 M2
BUN SERPL-MCNC: 13 MG/DL (ref 7–18.7)
CALCIUM SERPL-MCNC: 8.7 MG/DL (ref 8.4–10.2)
CHLORIDE SERPL-SCNC: 93 MMOL/L (ref 98–107)
CO2 SERPL-SCNC: 33 MMOL/L (ref 22–29)
CREAT SERPL-MCNC: 0.76 MG/DL (ref 0.55–1.02)
CV ECHO LV RWT: 0.64 CM
DOP CALC AO PEAK VEL: 1.52 M/S
DOP CALC LVOT AREA: 3.3 CM2
DOP CALC LVOT DIAMETER: 2.05 CM
DOP CALC LVOT PEAK VEL: 1.17 M/S
DOP CALC MV VTI: 0 CM
E WAVE DECELERATION TIME: 245.9 MSEC
E/A RATIO: 1.26
ECHO LV POSTERIOR WALL: 1.41 CM (ref 0.6–1.1)
EJECTION FRACTION: 55 %
EOSINOPHIL # BLD AUTO: 0.1 X10(3)/MCL (ref 0–0.9)
EOSINOPHIL NFR BLD AUTO: 1.2 %
ERYTHROCYTE [DISTWIDTH] IN BLOOD BY AUTOMATED COUNT: 19.9 % (ref 11.5–17)
FRACTIONAL SHORTENING: 34 % (ref 28–44)
GFR SERPLBLD CREATININE-BSD FMLA CKD-EPI: >60 MLS/MIN/1.73/M2
GLOBULIN SER-MCNC: 4.3 GM/DL (ref 2.4–3.5)
GLUCOSE SERPL-MCNC: 84 MG/DL (ref 74–100)
HCT VFR BLD AUTO: 27.6 % (ref 37–47)
HGB BLD-MCNC: 8.5 G/DL (ref 12–16)
IMM GRANULOCYTES # BLD AUTO: 0.03 X10(3)/MCL (ref 0–0.04)
IMM GRANULOCYTES NFR BLD AUTO: 0.4 %
INTERVENTRICULAR SEPTUM: 1.07 CM (ref 0.6–1.1)
LEFT ATRIUM SIZE: 0 CM
LEFT INTERNAL DIMENSION IN SYSTOLE: 2.9 CM (ref 2.1–4)
LEFT VENTRICLE DIASTOLIC VOLUME INDEX: 36.71 ML/M2
LEFT VENTRICLE DIASTOLIC VOLUME: 87.38 ML
LEFT VENTRICLE MASS INDEX: 84 G/M2
LEFT VENTRICLE SYSTOLIC VOLUME INDEX: 13.5 ML/M2
LEFT VENTRICLE SYSTOLIC VOLUME: 32.19 ML
LEFT VENTRICULAR INTERNAL DIMENSION IN DIASTOLE: 4.39 CM (ref 3.5–6)
LEFT VENTRICULAR MASS: 199.98 G
LYMPHOCYTES # BLD AUTO: 1.37 X10(3)/MCL (ref 0.6–4.6)
LYMPHOCYTES NFR BLD AUTO: 16.4 %
MAGNESIUM SERPL-MCNC: 1.6 MG/DL (ref 1.6–2.6)
MCH RBC QN AUTO: 32.7 PG (ref 27–31)
MCHC RBC AUTO-ENTMCNC: 30.8 G/DL (ref 33–36)
MCV RBC AUTO: 106.2 FL (ref 80–94)
MONOCYTES # BLD AUTO: 0.5 X10(3)/MCL (ref 0.1–1.3)
MONOCYTES NFR BLD AUTO: 6 %
MV MEAN GRADIENT: 0 MMHG
MV PEAK A VEL: 0.66 M/S
MV PEAK E VEL: 0.83 M/S
MV PEAK GRADIENT: 0 MMHG
MV STENOSIS PRESSURE HALF TIME: 71.31 MS
MV VALVE AREA P 1/2 METHOD: 3.09 CM2
NEUTROPHILS # BLD AUTO: 6.36 X10(3)/MCL (ref 2.1–9.2)
NEUTROPHILS NFR BLD AUTO: 75.9 %
PISA TR MAX VEL: 3.13 M/S
PLATELET # BLD AUTO: 326 X10(3)/MCL (ref 130–400)
PMV BLD AUTO: 9.8 FL (ref 7.4–10.4)
POTASSIUM SERPL-SCNC: 4 MMOL/L (ref 3.5–5.1)
PROT SERPL-MCNC: 7.3 GM/DL (ref 6.4–8.3)
PV PEAK GRADIENT: 4 MMHG
PV PEAK VELOCITY: 0.95 M/S
QEF: 63 %
RBC # BLD AUTO: 2.6 X10(6)/MCL (ref 4.2–5.4)
RIGHT VENTRICULAR END-DIASTOLIC DIMENSION: 3.6 CM
SODIUM SERPL-SCNC: 137 MMOL/L (ref 136–145)
TR MAX PG: 39 MMHG
WBC # SPEC AUTO: 8.37 X10(3)/MCL (ref 4.5–11.5)
Z-SCORE OF LEFT VENTRICULAR DIMENSION IN END DIASTOLE: -8.69
Z-SCORE OF LEFT VENTRICULAR DIMENSION IN END SYSTOLE: -6.1

## 2023-09-05 PROCEDURE — 25000003 PHARM REV CODE 250: Performed by: INTERNAL MEDICINE

## 2023-09-05 PROCEDURE — 63600175 PHARM REV CODE 636 W HCPCS: Performed by: INTERNAL MEDICINE

## 2023-09-05 PROCEDURE — 94761 N-INVAS EAR/PLS OXIMETRY MLT: CPT

## 2023-09-05 PROCEDURE — A4216 STERILE WATER/SALINE, 10 ML: HCPCS | Performed by: INTERNAL MEDICINE

## 2023-09-05 PROCEDURE — 99900035 HC TECH TIME PER 15 MIN (STAT)

## 2023-09-05 PROCEDURE — 83880 ASSAY OF NATRIURETIC PEPTIDE: CPT | Performed by: INTERNAL MEDICINE

## 2023-09-05 PROCEDURE — 80053 COMPREHEN METABOLIC PANEL: CPT | Performed by: INTERNAL MEDICINE

## 2023-09-05 PROCEDURE — S4991 NICOTINE PATCH NONLEGEND: HCPCS | Performed by: INTERNAL MEDICINE

## 2023-09-05 PROCEDURE — 85025 COMPLETE CBC W/AUTO DIFF WBC: CPT | Performed by: INTERNAL MEDICINE

## 2023-09-05 PROCEDURE — 83735 ASSAY OF MAGNESIUM: CPT | Performed by: INTERNAL MEDICINE

## 2023-09-05 RX ORDER — AZITHROMYCIN 500 MG/1
500 TABLET, FILM COATED ORAL DAILY
Qty: 3 TABLET | Refills: 0 | Status: SHIPPED | OUTPATIENT
Start: 2023-09-05 | End: 2023-09-08

## 2023-09-05 RX ORDER — PREDNISONE 20 MG/1
20 TABLET ORAL DAILY
Qty: 5 TABLET | Refills: 0 | Status: SHIPPED | OUTPATIENT
Start: 2023-09-05 | End: 2023-09-10

## 2023-09-05 RX ORDER — GUAIFENESIN 100 MG/5ML
200 SOLUTION ORAL EVERY 4 HOURS PRN
Qty: 236 ML | Refills: 3 | Status: SHIPPED | OUTPATIENT
Start: 2023-09-05 | End: 2023-09-15

## 2023-09-05 RX ADMIN — FUROSEMIDE 40 MG: 10 INJECTION, SOLUTION INTRAMUSCULAR; INTRAVENOUS at 09:09

## 2023-09-05 RX ADMIN — METOPROLOL TARTRATE 25 MG: 25 TABLET, FILM COATED ORAL at 09:09

## 2023-09-05 RX ADMIN — POTASSIUM CHLORIDE 20 MEQ: 1500 TABLET, EXTENDED RELEASE ORAL at 09:09

## 2023-09-05 RX ADMIN — AZITHROMYCIN MONOHYDRATE 500 MG: 500 INJECTION, POWDER, LYOPHILIZED, FOR SOLUTION INTRAVENOUS at 12:09

## 2023-09-05 RX ADMIN — NICOTINE 1 PATCH: 14 PATCH TRANSDERMAL at 09:09

## 2023-09-05 RX ADMIN — SODIUM CHLORIDE, PRESERVATIVE FREE 10 ML: 5 INJECTION INTRAVENOUS at 06:09

## 2023-09-05 RX ADMIN — RISPERIDONE 2 MG: 1 TABLET ORAL at 10:09

## 2023-09-05 NOTE — PLAN OF CARE
Problem: Adult Inpatient Plan of Care  Goal: Plan of Care Review  Outcome: Met  Goal: Patient-Specific Goal (Individualized)  Outcome: Met  Goal: Absence of Hospital-Acquired Illness or Injury  Outcome: Met  Goal: Optimal Comfort and Wellbeing  Outcome: Met  Goal: Readiness for Transition of Care  Outcome: Met     Problem: Bariatric Environmental Safety  Goal: Safety Maintained with Care  Outcome: Met     Problem: Behavioral Health Comorbidity  Goal: Maintenance of Behavioral Health Symptom Control  Outcome: Met     Problem: Hypertension Comorbidity  Goal: Blood Pressure in Desired Range  Outcome: Met     Problem: Pain Acute  Goal: Acceptable Pain Control and Functional Ability  Outcome: Met     Problem: Fatigue  Goal: Improved Activity Tolerance  Outcome: Met     Problem: Fall Injury Risk  Goal: Absence of Fall and Fall-Related Injury  Outcome: Met     Problem: Infection  Goal: Absence of Infection Signs and Symptoms  Outcome: Met     Problem: Balance Impairment (Functional Deficit)  Goal: Improved Balance and Postural Control  Outcome: Met     Problem: Cognitive Impairment (Functional Deficit)  Goal: Optimal Functional [place]  Outcome: Met     Problem: Coordination Impairment (Functional Deficit)  Goal: Optimal Coordination  Outcome: Met     Problem: Muscle Strength Impairment (Functional Deficit)  Goal: Improved Muscle Strength  Outcome: Met     Problem: Muscle Tone Impairment (Functional Deficit)  Goal: Improved Muscle Tone  Outcome: Met     Problem: Range of Motion Impairment (Functional Deficit)  Goal: Optimal Range of Motion  Outcome: Met     Problem: Sensory Impairment (Functional Deficit)  Goal: Compensation for Sensory Deficit  Outcome: Met     Problem: Fluid Imbalance (Pneumonia)  Goal: Fluid Balance  Outcome: Met     Problem: Infection (Pneumonia)  Goal: Resolution of Infection Signs and Symptoms  Outcome: Met     Problem: Respiratory Compromise (Pneumonia)  Goal: Effective Oxygenation and  Ventilation  Outcome: Met

## 2023-09-05 NOTE — PLAN OF CARE
09/05/23 1659   Discharge Assessment   Assessment Type Discharge Planning Assessment   Confirmed/corrected address, phone number and insurance Yes   Confirmed Demographics Correct on Facesheet   Source of Information patient   When was your last doctors appointment? 07/14/23   Communicated JASMYNE with patient/caregiver Yes   Reason For Admission sob   People in Home child(kiara), adult   Do you expect to return to your current living situation? Yes   Do you have help at home or someone to help you manage your care at home? Yes   Who are your caregiver(s) and their phone number(s)? Chemo and Ba (sons)   Prior to hospitilization cognitive status: Alert/Oriented   Current cognitive status: Alert/Oriented   Home Layout Able to live on 1st floor   Equipment Currently Used at Home none   Readmission within 30 days? No   Do you currently have service(s) that help you manage your care at home? No   Do you take prescription medications? Yes   Do you have any problems affording any of your prescribed medications? No   Is the patient taking medications as prescribed? yes   Who is going to help you get home at discharge? dgt   How do you get to doctors appointments? family or friend will provide   Are you on dialysis? No   Do you take coumadin? No   DME Needed Upon Discharge  none   Discharge Plan discussed with: Patient   Transition of Care Barriers None   Discharge Plan A Home with family   Discharge Plan B Home   Financial Resource Strain   How hard is it for you to pay for the very basics like food, housing, medical care, and heating? Hard   Housing Stability   In the last 12 months, was there a time when you were not able to pay the mortgage or rent on time? N   In the last 12 months, was there a time when you did not have a steady place to sleep or slept in a shelter (including now)? N   Transportation Needs   In the past 12 months, has lack of transportation kept you from medical appointments or from getting  medications? no   In the past 12 months, has lack of transportation kept you from meetings, work, or from getting things needed for daily living? No   Food Insecurity   Within the past 12 months, you worried that your food would run out before you got the money to buy more. Sometimes   Within the past 12 months, the food you bought just didn't last and you didn't have money to get more. Sometimes   Stress   Do you feel stress - tense, restless, nervous, or anxious, or unable to sleep at night because your mind is troubled all the time - these days? To some exte   Social Connections   In a typical week, how many times do you talk on the phone with family, friends, or neighbors? More than 3   How often do you get together with friends or relatives? Twice   Are you , , , , never , or living with a partner? Never marrie   Alcohol Use   Q1: How often do you have a drink containing alcohol? 2-3 per wk   Q2: How many drinks containing alcohol do you have on a typical day when you are drinking? 3 or 4   Q3: How often do you have six or more drinks on one occasion? Never     Pt denies any dc needs. She states her dgt will drive her home.  Pharm[facility]

## 2023-09-07 LAB
BACTERIA BLD CULT: NORMAL
BACTERIA BLD CULT: NORMAL

## 2023-09-07 NOTE — DISCHARGE SUMMARY
[facility]  Hospital Medicine  Discharge Summary      Patient Name: Nichole Lopez  MRN: [id number]  Admission Date: 9/2/2023  Hospital Length of Stay: 3 days  Discharge Date and Time: 9/5/2023  5:35 PM  Attending Physician: No att. providers found   Discharging Provider: Fariha Flynn MD  Discharge Provider Team: Networked reference to record PCT   Primary Care Provider: Wade Smith NP        HPI: Patient is a 42 y.o. morbidly obese female with a medical history of hypertension and psychiatric disorder presents to the ER on account of progressive shortness of breath over the past.    Patient has been at usual state of health until about few weeks ago when she developed progressive shortness of breath associated with cough productive of whitish sputum.  Also admits to easy fatigability.  With mild bilateral lower extremity swelling especially on the right lower extremities.  She denies any chest pain or palpitation..  Occasional nausea and vomiting.  No fever.  She decided to come to the ER for further evaluation.          Overview/Hospital Course:  9/4/23-patient is feeling better.  Still waiting for ECHO.  Also weaning O2 as well.  Was on 4 liters this morning an dis currently on 2 liters.  Likely d/c in 1-2 days       9/5; Patient weaned off oxygen and stable for discharge home.     * No surgery found *          Consults:     Final Active Diagnoses:    Diagnosis Date Noted POA    PRINCIPAL PROBLEM:  Acute hypoxemic respiratory failure [J96.01] 09/04/2023 Yes    Acute pulmonary edema [J81.0] 09/04/2023 Yes    Primary hypertension [I10] 09/04/2023 Yes    Class 3 severe obesity in adult [E66.01] 09/04/2023 Yes      Problems Resolved During this Admission:      Discharged Condition: fair    Disposition: Home or Self Care    Follow Up:    Patient Instructions:      Reason for not Ordering Smoking Cessation Referral     Order Specific Question Answer Comments   Reason for  not ordering: Not medically appropriate at this time      Reason for not Prescribing Nicotine Replacement     Order Specific Question Answer Comments   Reason for not Prescribing: Patient refused      Medications:  Reconciled Home Medications:      Medication List        START taking these medications      azithromycin 500 MG tablet  Commonly known as: ZITHROMAX  Take 1 tablet (500 mg total) by mouth once daily. for 3 days     guaiFENesin 100 mg/5 ml 100 mg/5 mL syrup  Commonly known as: ROBITUSSIN  Take 10 mLs (200 mg total) by mouth every 4 (four) hours as needed for Congestion.     predniSONE 20 MG tablet  Commonly known as: DELTASONE  Take 1 tablet (20 mg total) by mouth once daily. for 5 days            CONTINUE taking these medications      amLODIPine 10 MG tablet  Commonly known as: NORVASC  Take 10 mg by mouth once daily.     metoprolol tartrate 25 MG tablet  Commonly known as: LOPRESSOR  Take 25 mg by mouth 2 (two) times daily.     risperiDONE 2 MG tablet  Commonly known as: RISPERDAL  Take 2 mg by mouth 2 (two) times daily.              Significant Diagnostic Studies: N/A    Pending Diagnostic Studies:       None          Indwelling Lines/Drains at time of discharge:   Lines/Drains/Airways       None                   Time spent on the discharge of patient: 45 minutes         Fariha Flynn MD  Department of Hospital Medicine  [facility]

## 2023-12-04 PROBLEM — J81.0 ACUTE PULMONARY EDEMA: Status: RESOLVED | Noted: 2023-09-04 | Resolved: 2023-12-04

## 2023-12-04 PROBLEM — J96.01 ACUTE HYPOXEMIC RESPIRATORY FAILURE: Status: RESOLVED | Noted: 2023-09-04 | Resolved: 2023-12-04

## 2024-07-23 ENCOUNTER — HOSPITAL ENCOUNTER (EMERGENCY)
Facility: HOSPITAL | Age: 44
Discharge: HOME | End: 2024-07-23
Attending: INTERNAL MEDICINE
Payer: MEDICAID

## 2024-07-23 VITALS
HEIGHT: 67 IN | DIASTOLIC BLOOD PRESSURE: 83 MMHG | SYSTOLIC BLOOD PRESSURE: 113 MMHG | TEMPERATURE: 98 F | OXYGEN SATURATION: 100 % | HEART RATE: 98 BPM | BODY MASS INDEX: 41.34 KG/M2 | WEIGHT: 263.38 LBS | RESPIRATION RATE: 16 BRPM

## 2024-07-23 DIAGNOSIS — N93.8 DUB (DYSFUNCTIONAL UTERINE BLEEDING): Primary | ICD-10-CM

## 2024-07-23 LAB
ALBUMIN SERPL-MCNC: 3.1 G/DL (ref 3.5–5)
ALBUMIN/GLOB SERPL: 0.7 RATIO (ref 1.1–2)
ALP SERPL-CCNC: 124 UNIT/L (ref 40–150)
ALT SERPL-CCNC: 29 UNIT/L (ref 0–55)
AMPHET UR QL SCN: NEGATIVE
ANION GAP SERPL CALC-SCNC: 11 MEQ/L
APTT PPP: 27.2 SECONDS (ref 24.6–37.2)
AST SERPL-CCNC: 43 UNIT/L (ref 5–34)
B-HCG UR QL: NEGATIVE
BACTERIA #/AREA URNS AUTO: ABNORMAL /HPF
BARBITURATE SCN PRESENT UR: NEGATIVE
BASOPHILS # BLD AUTO: 0 X10(3)/MCL
BASOPHILS NFR BLD AUTO: 0 %
BENZODIAZ UR QL SCN: NEGATIVE
BILIRUB SERPL-MCNC: 0.8 MG/DL
BILIRUB UR QL STRIP.AUTO: ABNORMAL
BUN SERPL-MCNC: 6 MG/DL (ref 7–18.7)
CALCIUM SERPL-MCNC: 8.9 MG/DL (ref 8.4–10.2)
CANNABINOIDS UR QL SCN: NEGATIVE
CHLORIDE SERPL-SCNC: 105 MMOL/L (ref 98–107)
CLARITY UR: ABNORMAL
CO2 SERPL-SCNC: 22 MMOL/L (ref 22–29)
COCAINE UR QL SCN: NEGATIVE
COLOR UR AUTO: ABNORMAL
CREAT SERPL-MCNC: 0.73 MG/DL (ref 0.55–1.02)
CREAT/UREA NIT SERPL: 8
EOSINOPHIL # BLD AUTO: 0.07 X10(3)/MCL (ref 0–0.9)
EOSINOPHIL NFR BLD AUTO: 1.2 %
ERYTHROCYTE [DISTWIDTH] IN BLOOD BY AUTOMATED COUNT: 16.5 % (ref 11.5–17)
ETHANOL SERPL-MCNC: <10 MG/DL
FENTANYL UR QL SCN: NEGATIVE
GFR SERPLBLD CREATININE-BSD FMLA CKD-EPI: >60 ML/MIN/1.73/M2
GLOBULIN SER-MCNC: 4.6 GM/DL (ref 2.4–3.5)
GLUCOSE SERPL-MCNC: 100 MG/DL (ref 74–100)
GLUCOSE UR QL STRIP: NEGATIVE
HCT VFR BLD AUTO: 33.9 % (ref 37–47)
HGB BLD-MCNC: 10.4 G/DL (ref 12–16)
HGB UR QL STRIP: ABNORMAL
IMM GRANULOCYTES # BLD AUTO: 0.02 X10(3)/MCL (ref 0–0.04)
IMM GRANULOCYTES NFR BLD AUTO: 0.4 %
INR PPP: 1.1
KETONES UR QL STRIP: ABNORMAL
LEUKOCYTE ESTERASE UR QL STRIP: NEGATIVE
LYMPHOCYTES # BLD AUTO: 1.71 X10(3)/MCL (ref 0.6–4.6)
LYMPHOCYTES NFR BLD AUTO: 30 %
MCH RBC QN AUTO: 35.3 PG (ref 27–31)
MCHC RBC AUTO-ENTMCNC: 30.7 G/DL (ref 33–36)
MCV RBC AUTO: 114.9 FL (ref 80–94)
MDMA UR QL SCN: NEGATIVE
MONOCYTES # BLD AUTO: 0.37 X10(3)/MCL (ref 0.1–1.3)
MONOCYTES NFR BLD AUTO: 6.5 %
NEUTROPHILS # BLD AUTO: 3.53 X10(3)/MCL (ref 2.1–9.2)
NEUTROPHILS NFR BLD AUTO: 61.9 %
NITRITE UR QL STRIP: POSITIVE
OPIATES UR QL SCN: NEGATIVE
PCP UR QL: NEGATIVE
PH UR STRIP: 6 [PH]
PH UR: 6 [PH] (ref 3–11)
PLATELET # BLD AUTO: 160 X10(3)/MCL (ref 130–400)
PMV BLD AUTO: 11.6 FL (ref 7.4–10.4)
POTASSIUM SERPL-SCNC: 3.7 MMOL/L (ref 3.5–5.1)
PROT SERPL-MCNC: 7.7 GM/DL (ref 6.4–8.3)
PROT UR QL STRIP: ABNORMAL
PROTHROMBIN TIME: 14.9 SECONDS (ref 12.5–14.5)
RBC # BLD AUTO: 2.95 X10(6)/MCL (ref 4.2–5.4)
RBC #/AREA URNS AUTO: ABNORMAL /HPF
RET# (OHS): 0.07 X10E6/UL (ref 0.02–0.08)
RETICULOCYTE COUNT AUTOMATED (OLG): 2.48 % (ref 1.1–2.1)
SODIUM SERPL-SCNC: 138 MMOL/L (ref 136–145)
SP GR UR STRIP.AUTO: 1.02 (ref 1–1.03)
SPECIFIC GRAVITY, URINE AUTO (.000) (OHS): 1.02 (ref 1–1.03)
SQUAMOUS #/AREA URNS AUTO: ABNORMAL /HPF
TSH SERPL-ACNC: 1.14 UIU/ML (ref 0.35–4.94)
UROBILINOGEN UR STRIP-ACNC: 4
WBC # BLD AUTO: 5.7 X10(3)/MCL (ref 4.5–11.5)
WBC #/AREA URNS AUTO: ABNORMAL /HPF

## 2024-07-23 PROCEDURE — 85610 PROTHROMBIN TIME: CPT | Performed by: INTERNAL MEDICINE

## 2024-07-23 PROCEDURE — 84443 ASSAY THYROID STIM HORMONE: CPT | Performed by: INTERNAL MEDICINE

## 2024-07-23 PROCEDURE — 85730 THROMBOPLASTIN TIME PARTIAL: CPT | Performed by: INTERNAL MEDICINE

## 2024-07-23 PROCEDURE — 85025 COMPLETE CBC W/AUTO DIFF WBC: CPT | Performed by: INTERNAL MEDICINE

## 2024-07-23 PROCEDURE — 25000003 PHARM REV CODE 250: Performed by: INTERNAL MEDICINE

## 2024-07-23 PROCEDURE — 81025 URINE PREGNANCY TEST: CPT | Performed by: INTERNAL MEDICINE

## 2024-07-23 PROCEDURE — 82077 ASSAY SPEC XCP UR&BREATH IA: CPT | Performed by: INTERNAL MEDICINE

## 2024-07-23 PROCEDURE — 85045 AUTOMATED RETICULOCYTE COUNT: CPT | Performed by: INTERNAL MEDICINE

## 2024-07-23 PROCEDURE — 81003 URINALYSIS AUTO W/O SCOPE: CPT | Performed by: INTERNAL MEDICINE

## 2024-07-23 PROCEDURE — 80307 DRUG TEST PRSMV CHEM ANLYZR: CPT | Performed by: INTERNAL MEDICINE

## 2024-07-23 PROCEDURE — 80053 COMPREHEN METABOLIC PANEL: CPT | Performed by: INTERNAL MEDICINE

## 2024-07-23 RX ORDER — MEDROXYPROGESTERONE ACETATE 10 MG/1
10 TABLET ORAL DAILY
Qty: 30 TABLET | Refills: 0 | Status: SHIPPED | OUTPATIENT
Start: 2024-07-23 | End: 2025-07-23

## 2024-07-23 RX ORDER — MEDROXYPROGESTERONE ACETATE 10 MG/1
10 TABLET ORAL
Status: COMPLETED | OUTPATIENT
Start: 2024-07-23 | End: 2024-07-23

## 2024-07-23 RX ADMIN — MEDROXYPROGESTERONE ACETATE 10 MG: 10 TABLET ORAL at 11:07

## 2024-07-24 NOTE — ED PROVIDER NOTES
Encounter Date: 2024  History from patient     History     Chief Complaint   Patient presents with    Vaginal Bleeding     Pt states lmp started  and has not stopped since then. Heavy bleeding this week      HPI    Nichole Lopez is 43 y.o. female who  has a past medical history of Hypertension and Schizophrenia, unspecified. arrives in ER with c/o Vaginal Bleeding (Pt states lmp started  and has not stopped since then. Heavy bleeding this week )      Review of patient's allergies indicates:  No Known Allergies  Past Medical History:   Diagnosis Date    Hypertension     Schizophrenia, unspecified      Past Surgical History:   Procedure Laterality Date    BILATERAL TUBAL LIGATION Bilateral     BREAST BIOPSY Left 3/6/2023    Procedure: BIOPSY, BREAST;  Surgeon: David Mccarthy MD;  Location: [facility] OR;  Service: General;  Laterality: Left;     SECTION      LUMPECTOMY, BREAST Left 2023    Procedure: LUMPECTOMY, BREAST;  Surgeon: David Mccarthy MD;  Location: [facility] OR;  Service: General;  Laterality: Left;  **have ligasure available**    THYROID SURGERY       Family History   Problem Relation Name Age of Onset    Hypertension Mother      Heart attack Mother      COPD Father      Hypertension Father       Social History     Tobacco Use    Smoking status: Every Day     Types: Cigarettes     Passive exposure: Current    Smokeless tobacco: Never   Substance Use Topics    Alcohol use: Yes     Comment: Wine Every day    Drug use: Never     Comment: NOT QUESTIONED     Review of Systems   Constitutional:  Negative for fever.   HENT:  Negative for trouble swallowing and voice change.    Eyes:  Negative for visual disturbance.   Respiratory:  Negative for cough and shortness of breath.    Cardiovascular:  Negative for chest pain.   Gastrointestinal:  Negative for abdominal pain, diarrhea and vomiting.   Genitourinary:  Positive for vaginal bleeding. Negative for dysuria and  hematuria.   Musculoskeletal:  Negative for back pain and gait problem.   Skin:  Negative for color change and rash.   Neurological:  Positive for weakness. Negative for headaches.   Psychiatric/Behavioral:  Negative for behavioral problems and sleep disturbance.    All other systems reviewed and are negative.      Physical Exam     Initial Vitals [07/23/24 2203]   BP Pulse Resp Temp SpO2   120/86 105 18 98.1 °F (36.7 °C) 100 %      MAP       --         Physical Exam    Nursing note and vitals reviewed.  Constitutional: She appears well-developed and well-nourished. No distress.   HENT:   Head: Atraumatic.   Eyes: EOM are normal. Pupils are equal, round, and reactive to light.   Neck: Neck supple.   Cardiovascular:  Normal rate and regular rhythm.           Pulmonary/Chest: Breath sounds normal. No respiratory distress.   Abdominal: Abdomen is soft. Bowel sounds are normal. She exhibits no distension. There is no abdominal tenderness. There is no rebound.   Musculoskeletal:         General: No tenderness or edema. Normal range of motion.      Cervical back: Neck supple.     Neurological: She is alert and oriented to person, place, and time. GCS score is 15. GCS eye subscore is 4. GCS verbal subscore is 5. GCS motor subscore is 6.   Skin: Skin is warm and dry. There is pallor.   Psychiatric: She has a normal mood and affect.         ED Course   Procedures  Orders Placed This Encounter   Procedures    CBC auto differential    Comprehensive metabolic panel    Urinalysis, Reflex to Urine Culture    TSH    Reticulocytes    Ethanol    Drug Screen, Urine    APTT    Protime-INR    CBC with Differential    Pregnancy, urine rapid    Urinalysis, Microscopic    Insert Saline lock IV     Medications   medroxyPROGESTERone tablet 10 mg (10 mg Oral Given 7/23/24 1933)     Admission on 07/23/2024, Discharged on 07/23/2024   Component Date Value Ref Range Status    Sodium 07/23/2024 138  136 - 145 mmol/L Final    Potassium 07/23/2024  3.7  3.5 - 5.1 mmol/L Final    Chloride 07/23/2024 105  98 - 107 mmol/L Final    CO2 07/23/2024 22  22 - 29 mmol/L Final    Glucose 07/23/2024 100  74 - 100 mg/dL Final    Blood Urea Nitrogen 07/23/2024 6.0 (L)  7.0 - 18.7 mg/dL Final    Creatinine 07/23/2024 0.73  0.55 - 1.02 mg/dL Final    Calcium 07/23/2024 8.9  8.4 - 10.2 mg/dL Final    Protein Total 07/23/2024 7.7  6.4 - 8.3 gm/dL Final    Albumin 07/23/2024 3.1 (L)  3.5 - 5.0 g/dL Final    Globulin 07/23/2024 4.6 (H)  2.4 - 3.5 gm/dL Final    Albumin/Globulin Ratio 07/23/2024 0.7 (L)  1.1 - 2.0 ratio Final    Bilirubin Total 07/23/2024 0.8  <=1.5 mg/dL Final    ALP 07/23/2024 124  40 - 150 unit/L Final    ALT 07/23/2024 29  0 - 55 unit/L Final    AST 07/23/2024 43 (H)  5 - 34 unit/L Final    eGFR 07/23/2024 >60  mL/min/1.73/m2 Final    Anion Gap 07/23/2024 11.0  mEq/L Final    BUN/Creatinine Ratio 07/23/2024 8   Final    Color, UA 07/23/2024 Red (A)  Yellow, Light-Yellow, Dark Yellow, Nidia, Straw Final    Appearance, UA 07/23/2024 Slightly Cloudy (A)  Clear Final    Specific Gravity, UA 07/23/2024 1.025  1.005 - 1.030 Final    pH, UA 07/23/2024 6.0  5.0 - 8.5 Final    Protein, UA 07/23/2024 2+ (A)  Negative Final    Glucose, UA 07/23/2024 Negative  Negative, Normal Final    Ketones, UA 07/23/2024 Trace (A)  Negative Final    Blood, UA 07/23/2024 3+ (A)  Negative Final    Bilirubin, UA 07/23/2024 2+ (A)  Negative Final    Urobilinogen, UA 07/23/2024 4.0 (A)  0.2, 1.0, Normal Final    Nitrites, UA 07/23/2024 Positive (A)  Negative Final    Leukocyte Esterase, UA 07/23/2024 Negative  Negative Final    TSH 07/23/2024 1.139  0.350 - 4.940 uIU/mL Final    Retic Cnt Auto 07/23/2024 2.48 (H)  1.1 - 2.1 % Final    RET# 07/23/2024 0.0737  0.016 - 0.078 x10e6/uL Final    Ethanol Level 07/23/2024 <10.0  <=10.0 mg/dL Final    Amphetamines, Urine 07/23/2024 Negative  Negative Final    Barbiturates, Urine 07/23/2024 Negative  Negative Final    Benzodiazepine, Urine  07/23/2024 Negative  Negative Final    Cannabinoids, Urine 07/23/2024 Negative  Negative Final    Cocaine, Urine 07/23/2024 Negative  Negative Final    Fentanyl, Urine 07/23/2024 Negative  Negative Final    MDMA, Urine 07/23/2024 Negative  Negative Final    Opiates, Urine 07/23/2024 Negative  Negative Final    Phencyclidine, Urine 07/23/2024 Negative  Negative Final    pH, Urine 07/23/2024 6.0  3.0 - 11.0 Final    Specific Gravity, Urine Auto 07/23/2024 1.025  1.001 - 1.035 Final    PTT 07/23/2024 27.2  24.6 - 37.2 seconds Final    PT 07/23/2024 14.9 (H)  12.5 - 14.5 seconds Final    INR 07/23/2024 1.1  <=1.3 Final    WBC 07/23/2024 5.70  4.50 - 11.50 x10(3)/mcL Final    RBC 07/23/2024 2.95 (L)  4.20 - 5.40 x10(6)/mcL Final    Hgb 07/23/2024 10.4 (L)  12.0 - 16.0 g/dL Final    Hct 07/23/2024 33.9 (L)  37.0 - 47.0 % Final    MCV 07/23/2024 114.9 (H)  80.0 - 94.0 fL Final    MCH 07/23/2024 35.3 (H)  27.0 - 31.0 pg Final    MCHC 07/23/2024 30.7 (L)  33.0 - 36.0 g/dL Final    RDW 07/23/2024 16.5  11.5 - 17.0 % Final    Platelet 07/23/2024 160  130 - 400 x10(3)/mcL Final    MPV 07/23/2024 11.6 (H)  7.4 - 10.4 fL Final    Neut % 07/23/2024 61.9  % Final    Lymph % 07/23/2024 30.0  % Final    Mono % 07/23/2024 6.5  % Final    Eos % 07/23/2024 1.2  % Final    Basophil % 07/23/2024 0.0  % Final    Lymph # 07/23/2024 1.71  0.6 - 4.6 x10(3)/mcL Final    Neut # 07/23/2024 3.53  2.1 - 9.2 x10(3)/mcL Final    Mono # 07/23/2024 0.37  0.1 - 1.3 x10(3)/mcL Final    Eos # 07/23/2024 0.07  0 - 0.9 x10(3)/mcL Final    Baso # 07/23/2024 0.00  <=0.2 x10(3)/mcL Final    IG# 07/23/2024 0.02  0 - 0.04 x10(3)/mcL Final    IG% 07/23/2024 0.4  % Final    hCG Qualitative, Urine 07/23/2024 Negative  Negative Final    Bacteria, UA 07/23/2024 Occasional  None Seen, Rare, Occasional /HPF Final    RBC, UA 07/23/2024 21-50 (A)  None Seen, 0-2, 3-5, 0-5 /HPF Final    WBC, UA 07/23/2024 None Seen  None Seen, 0-2, 3-5, 0-5 /HPF Final    Squamous  Epithelial Cells, UA 07/23/2024 Occasional  None Seen, Rare, Occasional, Occ /HPF Final       Labs Reviewed   COMPREHENSIVE METABOLIC PANEL - Abnormal       Result Value    Sodium 138      Potassium 3.7      Chloride 105      CO2 22      Glucose 100      Blood Urea Nitrogen 6.0 (*)     Creatinine 0.73      Calcium 8.9      Protein Total 7.7      Albumin 3.1 (*)     Globulin 4.6 (*)     Albumin/Globulin Ratio 0.7 (*)     Bilirubin Total 0.8            ALT 29      AST 43 (*)     eGFR >60      Anion Gap 11.0      BUN/Creatinine Ratio 8     URINALYSIS, REFLEX TO URINE CULTURE - Abnormal    Color, UA Red (*)     Appearance, UA Slightly Cloudy (*)     Specific Gravity, UA 1.025      pH, UA 6.0      Protein, UA 2+ (*)     Glucose, UA Negative      Ketones, UA Trace (*)     Blood, UA 3+ (*)     Bilirubin, UA 2+ (*)     Urobilinogen, UA 4.0 (*)     Nitrites, UA Positive (*)     Leukocyte Esterase, UA Negative     RETICULOCYTES - Abnormal    Retic Cnt Auto 2.48 (*)     RET# 0.0737     PROTIME-INR - Abnormal    PT 14.9 (*)     INR 1.1     CBC WITH DIFFERENTIAL - Abnormal    WBC 5.70      RBC 2.95 (*)     Hgb 10.4 (*)     Hct 33.9 (*)     .9 (*)     MCH 35.3 (*)     MCHC 30.7 (*)     RDW 16.5      Platelet 160      MPV 11.6 (*)     Neut % 61.9      Lymph % 30.0      Mono % 6.5      Eos % 1.2      Basophil % 0.0      Lymph # 1.71      Neut # 3.53      Mono # 0.37      Eos # 0.07      Baso # 0.00      IG# 0.02      IG% 0.4     URINALYSIS, MICROSCOPIC - Abnormal    Bacteria, UA Occasional      RBC, UA 21-50 (*)     WBC, UA None Seen      Squamous Epithelial Cells, UA Occasional     TSH - Normal    TSH 1.139     ALCOHOL,MEDICAL (ETHANOL) - Normal    Ethanol Level <10.0     DRUG SCREEN, URINE (BEAKER) - Normal    Amphetamines, Urine Negative      Barbiturates, Urine Negative      Benzodiazepine, Urine Negative      Cannabinoids, Urine Negative      Cocaine, Urine Negative      Fentanyl, Urine Negative      MDMA, Urine  Negative      Opiates, Urine Negative      Phencyclidine, Urine Negative      pH, Urine 6.0      Specific Gravity, Urine Auto 1.025      Narrative:     Cut off concentrations:    Amphetamines - 1000 ng/ml  Barbiturates - 200 ng/ml  Benzodiazepine - 200 ng/ml  Cannabinoids (THC) - 50 ng/ml  Cocaine - 300 ng/ml  Fentanyl - 1.0 ng/ml  MDMA - 500 ng/ml  Opiates - 300 ng/ml   Phencyclidine (PCP) - 25 ng/ml    Specimen submitted for drug analysis and tested for pH and specific gravity in order to evaluate sample integrity. Suspect tampering if specific gravity is <1.003 and/or pH is not within the range of 4.5 - 8.0  False negatives may result form substances such as bleach added to urine.  False positives may result for the presence of a substance with similar chemical structure to the drug or its metabolite.    This test provides only a PRELIMINARY analytical test result. A more specific alternate chemical method must be used in order to obtain a confirmed analytical result. Gas chromatography/mass spectrometry (GC/MS) is the preferred confirmatory method. Other chemical confirmation methods are available. Clinical consideration and professional judgement should be applied to any drug of abuse test result, particularly when preliminary positive results are used.    Positive results will be confirmed only at the physicians request. Unconfirmed screening results are to be used only for medical purposes (treatment).        APTT - Normal    PTT 27.2     PREGNANCY TEST, URINE RAPID - Normal    hCG Qualitative, Urine Negative     CBC W/ AUTO DIFFERENTIAL    Narrative:     The following orders were created for panel order CBC auto differential.  Procedure                               Abnormality         Status                     ---------                               -----------         ------                     CBC with Differential[[id number]]       Abnormal            Final result                 Please view results for  these tests on the individual orders.          Imaging Results    None          Medications   medroxyPROGESTERone tablet 10 mg (10 mg Oral Given 7/23/24 2353)     Medical Decision Making    Nichole Lopez is 43 y.o. female who  has a past medical history of Hypertension and Schizophrenia, unspecified. arrives in ER with c/o Vaginal Bleeding (Pt states lmp started June 13th and has not stopped since then. Heavy bleeding this week )    Patient says that she has not had a period since 10/20/2023, on June 13 she started bleeding and has been passing heavy clots and blood every day since then, at last her children decided to tell her to call the family doctor and she can see the family doctor on Wednesday, today her children convince her she needs to come to the emergency room so she is here to get seen about it.  She says she has some discomfort in the lower abdomen, some discomfort in the chest, some weakness, and denies any other complaints.    Amount and/or Complexity of Data Reviewed  Labs: ordered.    Risk  OTC drugs.  Prescription drug management.               ED Course as of 07/24/24 0435   Tue Jul 23, 2024 2251 Although patient has been bleeding for 40 days but still her hemoglobin and hematocrit is better than what it was in September, she does not need any blood transfusion at this time, I will go ahead and put her on Provera to stop the bleeding and let her go with instruction that she must go and see a gyn doctor for further evaluation and treatment as needed. [GQ]      ED Course User Index  [GQ] Bev Kitchen MD                           Clinical Impression:  Final diagnoses:  [N93.8] DUB (dysfunctional uterine bleeding) (Primary)          ED Disposition Condition    Discharge Stable          ED Prescriptions       Medication Sig Dispense Start Date End Date Auth. Provider    medroxyPROGESTERone (PROVERA) 10 MG tablet Take 1 tablet (10 mg total) by mouth once daily. 30 tablet 7/23/2024 7/23/2025  Bev Kitchen MD    ferrous fumarate-iron polysaccharide complex (TANDEM) 162-115.2 (106) mg Cap Take 1 capsule by mouth daily with breakfast. 30 capsule 7/23/2024 -- Bev Kitchen MD          Follow-up Information       Follow up With Specialties Details Why Contact Info    Wade Smith NP Family Medicine In 1 day  [address] [place] [id number]  [phone]      Gynecologist  In 1 day               Bve Kitchen MD  07/24/24 4641

## 2024-08-03 DIAGNOSIS — N94.6 DYSMENORRHEA: Primary | ICD-10-CM

## 2024-08-03 DIAGNOSIS — N92.1 MENORRHAGIA WITH IRREGULAR CYCLE: ICD-10-CM

## 2024-08-03 DIAGNOSIS — D50.0 IRON DEFICIENCY ANEMIA SECONDARY TO BLOOD LOSS (CHRONIC): ICD-10-CM

## 2024-08-07 DIAGNOSIS — D50.0 IRON DEFICIENCY ANEMIA DUE TO CHRONIC BLOOD LOSS: Primary | ICD-10-CM

## 2024-08-20 ENCOUNTER — HOSPITAL ENCOUNTER (OUTPATIENT)
Dept: RADIOLOGY | Facility: HOSPITAL | Age: 44
Discharge: HOME | End: 2024-08-20
Attending: NURSE PRACTITIONER
Payer: MEDICARE

## 2024-08-20 DIAGNOSIS — D50.0 IRON DEFICIENCY ANEMIA SECONDARY TO BLOOD LOSS (CHRONIC): ICD-10-CM

## 2024-08-20 DIAGNOSIS — N92.1 MENORRHAGIA WITH IRREGULAR CYCLE: ICD-10-CM

## 2024-08-20 DIAGNOSIS — N94.6 DYSMENORRHEA: ICD-10-CM

## 2024-08-20 PROCEDURE — 76856 US EXAM PELVIC COMPLETE: CPT | Mod: TC

## 2024-08-29 ENCOUNTER — OFFICE VISIT (OUTPATIENT)
Dept: HEMATOLOGY/ONCOLOGY | Facility: CLINIC | Age: 44
End: 2024-08-29
Payer: MEDICARE

## 2024-08-29 VITALS
OXYGEN SATURATION: 97 % | SYSTOLIC BLOOD PRESSURE: 118 MMHG | TEMPERATURE: 99 F | BODY MASS INDEX: 40.05 KG/M2 | HEART RATE: 90 BPM | RESPIRATION RATE: 18 BRPM | DIASTOLIC BLOOD PRESSURE: 81 MMHG | HEIGHT: 67 IN | WEIGHT: 255.19 LBS

## 2024-08-29 DIAGNOSIS — D50.0 IRON DEFICIENCY ANEMIA DUE TO CHRONIC BLOOD LOSS: ICD-10-CM

## 2024-08-29 DIAGNOSIS — D50.0 IRON DEFICIENCY ANEMIA DUE TO CHRONIC BLOOD LOSS: Primary | ICD-10-CM

## 2024-08-29 PROCEDURE — 99204 OFFICE O/P NEW MOD 45 MIN: CPT | Mod: S$GLB,,, | Performed by: INTERNAL MEDICINE

## 2024-08-29 PROCEDURE — 99999 PR PBB SHADOW E&M-EST. PATIENT-LVL IV: CPT | Mod: PBBFAC,,, | Performed by: INTERNAL MEDICINE

## 2024-08-29 PROCEDURE — 3079F DIAST BP 80-89 MM HG: CPT | Mod: CPTII,S$GLB,, | Performed by: INTERNAL MEDICINE

## 2024-08-29 PROCEDURE — 1159F MED LIST DOCD IN RCRD: CPT | Mod: CPTII,S$GLB,, | Performed by: INTERNAL MEDICINE

## 2024-08-29 PROCEDURE — 3074F SYST BP LT 130 MM HG: CPT | Mod: CPTII,S$GLB,, | Performed by: INTERNAL MEDICINE

## 2024-08-29 PROCEDURE — 3008F BODY MASS INDEX DOCD: CPT | Mod: CPTII,S$GLB,, | Performed by: INTERNAL MEDICINE

## 2024-08-29 RX ORDER — LINACLOTIDE 72 UG/1
CAPSULE, GELATIN COATED ORAL
COMMUNITY
Start: 2024-07-30

## 2024-08-29 RX ORDER — GABAPENTIN 300 MG/1
1 CAPSULE ORAL
COMMUNITY

## 2024-08-29 RX ORDER — EPINEPHRINE 0.3 MG/.3ML
0.3 INJECTION SUBCUTANEOUS ONCE AS NEEDED
OUTPATIENT
Start: 2024-09-05

## 2024-08-29 RX ORDER — BUPROPION HYDROCHLORIDE 150 MG/1
TABLET, EXTENDED RELEASE ORAL
COMMUNITY

## 2024-08-29 RX ORDER — SODIUM CHLORIDE 0.9 % (FLUSH) 0.9 %
10 SYRINGE (ML) INJECTION
OUTPATIENT
Start: 2024-09-05

## 2024-08-29 RX ORDER — DIPHENHYDRAMINE HYDROCHLORIDE 50 MG/ML
50 INJECTION INTRAMUSCULAR; INTRAVENOUS ONCE AS NEEDED
OUTPATIENT
Start: 2024-09-05

## 2024-08-29 RX ORDER — HEPARIN 100 UNIT/ML
500 SYRINGE INTRAVENOUS
OUTPATIENT
Start: 2024-09-05

## 2024-08-29 NOTE — PROGRESS NOTES
PATIENT: Nichole Lopez  MRN: [id number]  DATE: 2024        Chief Complaint: No chief complaint on file.      Oncologic History:      Oncologic History     Oncologic Treatment     Pathology    44 yo female initiated on oral iron 2024 . She had no periods from February to  but then had heavy periods in , July and August never stopped. She went to GYN in [place]. Ferritin is dropping despite oral iron  8/15/24 WBC 7.8, HGB 11.7, MCV 78, PLT 425K iron sat 9%, iron 48, TIBC 508, ESR 16,   24 ferritin 10  24 ferritin 14  3/4/24 WBC 9.4, HGB 10.5, MCV 77, PLT 460K ferritin 13  24 B12 756, folate 7.5  2024 WBC 9.8, HGB 10.4, HCT 33, MCV 75, PLT 452K    Subjective:   Interval History: Ms. Lopez is a 43 y.o. female who returns for new evaluation and treatment of. Iron deficiency anemia not responding to oral iron.      Past Medical History:   Past Medical History:   Diagnosis Date    Hypertension     Schizophrenia, unspecified        Past Surgical HIstory:   Past Surgical History:   Procedure Laterality Date    BILATERAL TUBAL LIGATION Bilateral     BREAST BIOPSY Left 3/6/2023    Procedure: BIOPSY, BREAST;  Surgeon: David Mccarthy MD;  Location: [facility] OR;  Service: General;  Laterality: Left;     SECTION      LUMPECTOMY, BREAST Left 2023    Procedure: LUMPECTOMY, BREAST;  Surgeon: David Mccarthy MD;  Location: [facility] OR;  Service: General;  Laterality: Left;  **have ligasure available**    THYROID SURGERY         Family History:   Family History   Problem Relation Name Age of Onset    Hypertension Mother      Heart attack Mother      COPD Father      Hypertension Father         Social History:  reports that she has been smoking cigarettes. She has been exposed to tobacco smoke. She has never used smokeless tobacco. She reports current alcohol use. She reports that she does not use drugs.    Allergies:  Review of patient's allergies indicates:  No Known  Allergies    Medications:  Current Outpatient Medications   Medication Sig Dispense Refill    amLODIPine (NORVASC) 10 MG tablet Take 10 mg by mouth once daily.      ferrous fumarate-iron polysaccharide complex (TANDEM) 162-115.2 (106) mg Cap Take 1 capsule by mouth daily with breakfast. 30 capsule 3    medroxyPROGESTERone (PROVERA) 10 MG tablet Take 1 tablet (10 mg total) by mouth once daily. 30 tablet 0    metoprolol tartrate (LOPRESSOR) 25 MG tablet Take 25 mg by mouth 2 (two) times daily.      risperiDONE (RISPERDAL) 2 MG tablet Take 2 mg by mouth 2 (two) times daily.       No current facility-administered medications for this visit.       Review of Systems    ECOG Performance Status:   ECOG SCORE             Objective:      Vitals: There were no vitals filed for this visit.  BMI: There is no height or weight on file to calculate BMI.    Physical Exam    Laboratory Data:  No visits with results within 1 Week(s) from this visit.   Latest known visit with results is:   Admission on 07/23/2024, Discharged on 07/23/2024   Component Date Value Ref Range Status    Sodium 07/23/2024 138  136 - 145 mmol/L Final    Potassium 07/23/2024 3.7  3.5 - 5.1 mmol/L Final    Chloride 07/23/2024 105  98 - 107 mmol/L Final    CO2 07/23/2024 22  22 - 29 mmol/L Final    Glucose 07/23/2024 100  74 - 100 mg/dL Final    Blood Urea Nitrogen 07/23/2024 6.0 (L)  7.0 - 18.7 mg/dL Final    Creatinine 07/23/2024 0.73  0.55 - 1.02 mg/dL Final    Calcium 07/23/2024 8.9  8.4 - 10.2 mg/dL Final    Protein Total 07/23/2024 7.7  6.4 - 8.3 gm/dL Final    Albumin 07/23/2024 3.1 (L)  3.5 - 5.0 g/dL Final    Globulin 07/23/2024 4.6 (H)  2.4 - 3.5 gm/dL Final    Albumin/Globulin Ratio 07/23/2024 0.7 (L)  1.1 - 2.0 ratio Final    Bilirubin Total 07/23/2024 0.8  <=1.5 mg/dL Final    ALP 07/23/2024 124  40 - 150 unit/L Final    ALT 07/23/2024 29  0 - 55 unit/L Final    AST 07/23/2024 43 (H)  5 - 34 unit/L Final    eGFR 07/23/2024 >60  mL/min/1.73/m2 Final     Anion Gap 07/23/2024 11.0  mEq/L Final    BUN/Creatinine Ratio 07/23/2024 8   Final    Color, UA 07/23/2024 Red (A)  Yellow, Light-Yellow, Dark Yellow, Nidia, Straw Final    Appearance, UA 07/23/2024 Slightly Cloudy (A)  Clear Final    Specific Gravity, UA 07/23/2024 1.025  1.005 - 1.030 Final    pH, UA 07/23/2024 6.0  5.0 - 8.5 Final    Protein, UA 07/23/2024 2+ (A)  Negative Final    Glucose, UA 07/23/2024 Negative  Negative, Normal Final    Ketones, UA 07/23/2024 Trace (A)  Negative Final    Blood, UA 07/23/2024 3+ (A)  Negative Final    Bilirubin, UA 07/23/2024 2+ (A)  Negative Final    Urobilinogen, UA 07/23/2024 4.0 (A)  0.2, 1.0, Normal Final    Nitrites, UA 07/23/2024 Positive (A)  Negative Final    Leukocyte Esterase, UA 07/23/2024 Negative  Negative Final    TSH 07/23/2024 1.139  0.350 - 4.940 uIU/mL Final    Retic Cnt Auto 07/23/2024 2.48 (H)  1.1 - 2.1 % Final    RET# 07/23/2024 0.0737  0.016 - 0.078 x10e6/uL Final    Ethanol Level 07/23/2024 <10.0  <=10.0 mg/dL Final    This assay is performed for medical purposes only.    Amphetamines, Urine 07/23/2024 Negative  Negative Final    Barbiturates, Urine 07/23/2024 Negative  Negative Final    Benzodiazepine, Urine 07/23/2024 Negative  Negative Final    Cannabinoids, Urine 07/23/2024 Negative  Negative Final    Cocaine, Urine 07/23/2024 Negative  Negative Final    Fentanyl, Urine 07/23/2024 Negative  Negative Final    MDMA, Urine 07/23/2024 Negative  Negative Final    Opiates, Urine 07/23/2024 Negative  Negative Final    Phencyclidine, Urine 07/23/2024 Negative  Negative Final    pH, Urine 07/23/2024 6.0  3.0 - 11.0 Final    Specific Gravity, Urine Auto 07/23/2024 1.025  1.001 - 1.035 Final    PTT 07/23/2024 27.2  24.6 - 37.2 seconds Final    For Minimal Heparin Infusion, the goal aPTT 64-85 seconds corresponds to an anti-Xa of 0.3-0.5.    For Low Intensity and High Intensity Heparin, the goal aPTT  seconds corresponds to an anti-Xa of 0.3-0.7    PT  07/23/2024 14.9 (H)  12.5 - 14.5 seconds Final    INR 07/23/2024 1.1  <=1.3 Final    WBC 07/23/2024 5.70  4.50 - 11.50 x10(3)/mcL Final    RBC 07/23/2024 2.95 (L)  4.20 - 5.40 x10(6)/mcL Final    Hgb 07/23/2024 10.4 (L)  12.0 - 16.0 g/dL Final    Hct 07/23/2024 33.9 (L)  37.0 - 47.0 % Final    MCV 07/23/2024 114.9 (H)  80.0 - 94.0 fL Final    MCH 07/23/2024 35.3 (H)  27.0 - 31.0 pg Final    MCHC 07/23/2024 30.7 (L)  33.0 - 36.0 g/dL Final    RDW 07/23/2024 16.5  11.5 - 17.0 % Final    Platelet 07/23/2024 160  130 - 400 x10(3)/mcL Final    MPV 07/23/2024 11.6 (H)  7.4 - 10.4 fL Final    Neut % 07/23/2024 61.9  % Final    Lymph % 07/23/2024 30.0  % Final    Mono % 07/23/2024 6.5  % Final    Eos % 07/23/2024 1.2  % Final    Basophil % 07/23/2024 0.0  % Final    Lymph # 07/23/2024 1.71  0.6 - 4.6 x10(3)/mcL Final    Neut # 07/23/2024 3.53  2.1 - 9.2 x10(3)/mcL Final    Mono # 07/23/2024 0.37  0.1 - 1.3 x10(3)/mcL Final    Eos # 07/23/2024 0.07  0 - 0.9 x10(3)/mcL Final    Baso # 07/23/2024 0.00  <=0.2 x10(3)/mcL Final    IG# 07/23/2024 0.02  0 - 0.04 x10(3)/mcL Final    IG% 07/23/2024 0.4  % Final    hCG Qualitative, Urine 07/23/2024 Negative  Negative Final    Bacteria, UA 07/23/2024 Occasional  None Seen, Rare, Occasional /HPF Final    RBC, UA 07/23/2024 21-50 (A)  None Seen, 0-2, 3-5, 0-5 /HPF Final    WBC, UA 07/23/2024 None Seen  None Seen, 0-2, 3-5, 0-5 /HPF Final    Squamous Epithelial Cells, UA 07/23/2024 Occasional  None Seen, Rare, Occasional, Occ /HPF Final         Imaging: FINDINGS:  Transabdominal  scanning was performed. Multiple sonographic images reveal the uterus to be mildly prominent in size measuring 10.5 x 5.0 x 4.9 cm.  The uterus is retroverted in position.  The right ovary measures 2.7 x 2.0 x 2.1 cm and demonstrates venous flow.  The left ovary measures 3.4 x 2.1 x 2.8 cm and demonstrates venous flow.   The endometrium is not thickened. No free fluid is seen within the cul-de-sac. No abnormal  adnexal mass is seen.     Impression:     1. Mildly prominent retroverted uterus  Assessment:     No diagnosis found.  Drop in ferritin despite oral iron. Will set up for IV iron then have her return 6-8 weeks later with CBC and iron studies. She is already under the care of a GYN for heavy menses.    Plan:     Problem List Items Addressed This Visit    None

## 2024-09-04 ENCOUNTER — INFUSION (OUTPATIENT)
Dept: INFUSION THERAPY | Facility: HOSPITAL | Age: 44
End: 2024-09-04
Payer: MEDICARE

## 2024-09-04 VITALS
HEIGHT: 67 IN | OXYGEN SATURATION: 97 % | TEMPERATURE: 99 F | BODY MASS INDEX: 40.08 KG/M2 | WEIGHT: 255.38 LBS | SYSTOLIC BLOOD PRESSURE: 115 MMHG | DIASTOLIC BLOOD PRESSURE: 78 MMHG | HEART RATE: 90 BPM

## 2024-09-04 DIAGNOSIS — D50.0 IRON DEFICIENCY ANEMIA DUE TO CHRONIC BLOOD LOSS: Primary | ICD-10-CM

## 2024-09-04 PROCEDURE — 63600175 PHARM REV CODE 636 W HCPCS: Mod: JZ,JG | Performed by: INTERNAL MEDICINE

## 2024-09-04 PROCEDURE — 96365 THER/PROPH/DIAG IV INF INIT: CPT

## 2024-09-04 PROCEDURE — 25000003 PHARM REV CODE 250: Performed by: INTERNAL MEDICINE

## 2024-09-04 RX ORDER — SODIUM CHLORIDE 0.9 % (FLUSH) 0.9 %
10 SYRINGE (ML) INJECTION
OUTPATIENT
Start: 2024-09-04

## 2024-09-04 RX ORDER — SODIUM CHLORIDE 0.9 % (FLUSH) 0.9 %
10 SYRINGE (ML) INJECTION
Status: DISCONTINUED | OUTPATIENT
Start: 2024-09-04 | End: 2024-09-04 | Stop reason: HOSPADM

## 2024-09-04 RX ORDER — EPINEPHRINE 0.3 MG/.3ML
0.3 INJECTION SUBCUTANEOUS ONCE AS NEEDED
OUTPATIENT
Start: 2024-09-04

## 2024-09-04 RX ORDER — DIPHENHYDRAMINE HYDROCHLORIDE 50 MG/ML
50 INJECTION INTRAMUSCULAR; INTRAVENOUS ONCE AS NEEDED
OUTPATIENT
Start: 2024-09-04

## 2024-09-04 RX ORDER — HEPARIN 100 UNIT/ML
500 SYRINGE INTRAVENOUS
OUTPATIENT
Start: 2024-09-04

## 2024-09-04 RX ADMIN — FERUMOXYTOL 510 MG: 510 INJECTION INTRAVENOUS at 01:09

## 2024-09-04 RX ADMIN — SODIUM CHLORIDE: 9 INJECTION, SOLUTION INTRAVENOUS at 01:09

## 2024-09-13 ENCOUNTER — INFUSION (OUTPATIENT)
Dept: INFUSION THERAPY | Facility: HOSPITAL | Age: 44
End: 2024-09-13
Payer: MEDICARE

## 2024-09-13 VITALS
BODY MASS INDEX: 39.71 KG/M2 | OXYGEN SATURATION: 99 % | HEIGHT: 67 IN | DIASTOLIC BLOOD PRESSURE: 75 MMHG | TEMPERATURE: 98 F | HEART RATE: 105 BPM | SYSTOLIC BLOOD PRESSURE: 119 MMHG | WEIGHT: 253 LBS

## 2024-09-13 DIAGNOSIS — D50.0 IRON DEFICIENCY ANEMIA DUE TO CHRONIC BLOOD LOSS: Primary | ICD-10-CM

## 2024-09-13 PROCEDURE — 63600175 PHARM REV CODE 636 W HCPCS: Mod: JZ,JG | Performed by: INTERNAL MEDICINE

## 2024-09-13 PROCEDURE — 96365 THER/PROPH/DIAG IV INF INIT: CPT

## 2024-09-13 PROCEDURE — 25000003 PHARM REV CODE 250: Performed by: INTERNAL MEDICINE

## 2024-09-13 RX ORDER — SODIUM CHLORIDE 0.9 % (FLUSH) 0.9 %
10 SYRINGE (ML) INJECTION
OUTPATIENT
Start: 2024-09-13

## 2024-09-13 RX ORDER — EPINEPHRINE 0.3 MG/.3ML
0.3 INJECTION SUBCUTANEOUS ONCE AS NEEDED
OUTPATIENT
Start: 2024-09-13

## 2024-09-13 RX ORDER — DIPHENHYDRAMINE HYDROCHLORIDE 50 MG/ML
50 INJECTION INTRAMUSCULAR; INTRAVENOUS ONCE AS NEEDED
OUTPATIENT
Start: 2024-09-13

## 2024-09-13 RX ORDER — HEPARIN 100 UNIT/ML
500 SYRINGE INTRAVENOUS
OUTPATIENT
Start: 2024-09-13

## 2024-09-13 RX ADMIN — SODIUM CHLORIDE: 9 INJECTION, SOLUTION INTRAVENOUS at 11:09

## 2024-09-13 RX ADMIN — FERUMOXYTOL 510 MG: 510 INJECTION INTRAVENOUS at 11:09

## 2024-11-06 ENCOUNTER — OFFICE VISIT (OUTPATIENT)
Dept: HEMATOLOGY/ONCOLOGY | Facility: CLINIC | Age: 44
End: 2024-11-06
Payer: MEDICARE

## 2024-11-06 ENCOUNTER — LAB VISIT (OUTPATIENT)
Dept: LAB | Facility: HOSPITAL | Age: 44
End: 2024-11-06
Attending: INTERNAL MEDICINE
Payer: MEDICARE

## 2024-11-06 VITALS
WEIGHT: 254 LBS | OXYGEN SATURATION: 99 % | DIASTOLIC BLOOD PRESSURE: 77 MMHG | SYSTOLIC BLOOD PRESSURE: 112 MMHG | HEIGHT: 67 IN | BODY MASS INDEX: 39.87 KG/M2 | RESPIRATION RATE: 20 BRPM

## 2024-11-06 DIAGNOSIS — D50.0 IRON DEFICIENCY ANEMIA DUE TO CHRONIC BLOOD LOSS: Primary | ICD-10-CM

## 2024-11-06 DIAGNOSIS — D50.0 IRON DEFICIENCY ANEMIA DUE TO CHRONIC BLOOD LOSS: ICD-10-CM

## 2024-11-06 LAB
BASOPHILS # BLD AUTO: 0.02 X10(3)/MCL
BASOPHILS NFR BLD AUTO: 0.4 %
EOSINOPHIL # BLD AUTO: 0.08 X10(3)/MCL (ref 0–0.9)
EOSINOPHIL NFR BLD AUTO: 1.5 %
ERYTHROCYTE [DISTWIDTH] IN BLOOD BY AUTOMATED COUNT: 23.5 % (ref 11.5–17)
FERRITIN SERPL-MCNC: 250.74 NG/ML (ref 4.63–204)
HCT VFR BLD AUTO: 34 % (ref 37–47)
HGB BLD-MCNC: 11.6 G/DL (ref 12–16)
IMM GRANULOCYTES # BLD AUTO: 0.01 X10(3)/MCL (ref 0–0.04)
IMM GRANULOCYTES NFR BLD AUTO: 0.2 %
IRON SATN MFR SERPL: ABNORMAL %
IRON SERPL-MCNC: 347 UG/DL (ref 50–170)
LYMPHOCYTES # BLD AUTO: 2.11 X10(3)/MCL (ref 0.6–4.6)
LYMPHOCYTES NFR BLD AUTO: 40.3 %
MCH RBC QN AUTO: 37.8 PG (ref 27–31)
MCHC RBC AUTO-ENTMCNC: 34.1 G/DL (ref 33–36)
MCV RBC AUTO: 110.7 FL (ref 80–94)
MONOCYTES # BLD AUTO: 0.51 X10(3)/MCL (ref 0.1–1.3)
MONOCYTES NFR BLD AUTO: 9.8 %
NEUTROPHILS # BLD AUTO: 2.5 X10(3)/MCL (ref 2.1–9.2)
NEUTROPHILS NFR BLD AUTO: 47.8 %
PLATELET # BLD AUTO: 122 X10(3)/MCL (ref 130–400)
PMV BLD AUTO: 12.7 FL (ref 7.4–10.4)
RBC # BLD AUTO: 3.07 X10(6)/MCL (ref 4.2–5.4)
TIBC SERPL-MCNC: <25 UG/DL (ref 70–310)
TIBC SERPL-MCNC: ABNORMAL UG/DL
WBC # BLD AUTO: 5.23 X10(3)/MCL (ref 4.5–11.5)

## 2024-11-06 PROCEDURE — 3074F SYST BP LT 130 MM HG: CPT | Mod: CPTII,S$GLB,, | Performed by: NURSE PRACTITIONER

## 2024-11-06 PROCEDURE — 3078F DIAST BP <80 MM HG: CPT | Mod: CPTII,S$GLB,, | Performed by: NURSE PRACTITIONER

## 2024-11-06 PROCEDURE — 99999 PR PBB SHADOW E&M-EST. PATIENT-LVL III: CPT | Mod: PBBFAC,,, | Performed by: NURSE PRACTITIONER

## 2024-11-06 PROCEDURE — 3008F BODY MASS INDEX DOCD: CPT | Mod: CPTII,S$GLB,, | Performed by: NURSE PRACTITIONER

## 2024-11-06 PROCEDURE — 83550 IRON BINDING TEST: CPT

## 2024-11-06 PROCEDURE — 1159F MED LIST DOCD IN RCRD: CPT | Mod: CPTII,S$GLB,, | Performed by: NURSE PRACTITIONER

## 2024-11-06 PROCEDURE — 82728 ASSAY OF FERRITIN: CPT

## 2024-11-06 PROCEDURE — 99214 OFFICE O/P EST MOD 30 MIN: CPT | Mod: S$GLB,,, | Performed by: NURSE PRACTITIONER

## 2024-11-06 PROCEDURE — 85025 COMPLETE CBC W/AUTO DIFF WBC: CPT

## 2024-11-06 PROCEDURE — 36415 COLL VENOUS BLD VENIPUNCTURE: CPT

## 2024-11-06 RX ORDER — FERROUS SULFATE 324(65)MG
65 TABLET, DELAYED RELEASE (ENTERIC COATED) ORAL
COMMUNITY

## 2024-11-13 NOTE — PROGRESS NOTES
PATIENT: Nichole Lopez  MRN: [id number]  DATE: 2024        Chief Complaint: Iron Deficiency Anemia (F/u with labs-- Pt reports less fatigue since iron infusion )      Oncologic History:      Oncologic History     Oncologic Treatment     Pathology    42 yo female initiated on oral iron 2024 . She had no periods from February to  but then had heavy periods in , July and August never stopped. She went to GYN in [place]. Ferritin is dropping despite oral iron  8/15/24 WBC 7.8, HGB 11.7, MCV 78, PLT 425K iron sat 9%, iron 48, TIBC 508, ESR 16,   24 ferritin 10  24 ferritin 14  3/4/24 WBC 9.4, HGB 10.5, MCV 77, PLT 460K ferritin 13  24 B12 756, folate 7.5  2024 WBC 9.8, HGB 10.4, HCT 33, MCV 75, PLT 452K    Subjective:   Interval History: Ms. Lopez is a 44 y.o. female who returns for new evaluation and treatment of. Iron deficiency anemia not responding to oral iron.      Past Medical History:   Past Medical History:   Diagnosis Date    Hypertension     Schizophrenia, unspecified        Past Surgical HIstory:   Past Surgical History:   Procedure Laterality Date    BILATERAL TUBAL LIGATION Bilateral     BREAST BIOPSY Left 3/6/2023    Procedure: BIOPSY, BREAST;  Surgeon: David Mccarthy MD;  Location: [facility] OR;  Service: General;  Laterality: Left;     SECTION      LUMPECTOMY, BREAST Left 2023    Procedure: LUMPECTOMY, BREAST;  Surgeon: David Mccarthy MD;  Location: [facility] OR;  Service: General;  Laterality: Left;  **have ligasure available**    THYROID SURGERY         Family History:   Family History   Problem Relation Name Age of Onset    Hypertension Mother      Heart attack Mother      COPD Father      Hypertension Father         Social History:  reports that she has been smoking cigarettes. She has been exposed to tobacco smoke. She has never used smokeless tobacco. She reports current alcohol use. She reports that she does not use drugs.    Allergies:  Review  "of patient's allergies indicates:  No Known Allergies    Medications:  Current Outpatient Medications   Medication Sig Dispense Refill    buPROPion (WELLBUTRIN SR) 150 MG TBSR 12 hr tablet 1 tablet Orally twice a day for 30 days      ferrous sulfate 324 mg (65 mg iron) TbEC Take 65 mg by mouth.      LINZESS 72 mcg Cap capsule TAKE ONE CAPSULE BY MOUTH ONCE DAILY AT LEAST 30 MINUTES BEFORE THE FIRST A MEAL OF THE DAY on an empty stomach      NEURONTIN 300 mg capsule 1 capsule.       No current facility-administered medications for this visit.       Review of Systems    ECOG Performance Status:   ECOG SCORE             Objective:      Vitals:   Vitals:    11/06/24 1259   BP: 112/77   BP Location: Right arm   Resp: 20   SpO2: 99%   Weight: 115.2 kg (254 lb)   Height: 5' 7.01" (1.702 m)     BMI: Body mass index is 39.77 kg/m².    Physical Exam    Laboratory Data:  Lab Visit on 11/06/2024   Component Date Value Ref Range Status    Iron Binding Capacity Unsaturated 11/06/2024 <25 (L)  70 - 310 ug/dL Final    Iron Level 11/06/2024 347 (H)  50 - 170 ug/dL Final    Iron Binding Capacity Total 11/06/2024    Final    Unable to obtain    Iron Saturation 11/06/2024    Final    Unable to obtain    Ferritin Level 11/06/2024 250.74 (H)  4.63 - 204.00 ng/mL Final    WBC 11/06/2024 5.23  4.50 - 11.50 x10(3)/mcL Final    RBC 11/06/2024 3.07 (L)  4.20 - 5.40 x10(6)/mcL Final    Hgb 11/06/2024 11.6 (L)  12.0 - 16.0 g/dL Final    Hct 11/06/2024 34.0 (L)  37.0 - 47.0 % Final    MCV 11/06/2024 110.7 (H)  80.0 - 94.0 fL Final    MCH 11/06/2024 37.8 (H)  27.0 - 31.0 pg Final    MCHC 11/06/2024 34.1  33.0 - 36.0 g/dL Final    RDW 11/06/2024 23.5 (H)  11.5 - 17.0 % Final    Platelet 11/06/2024 122 (L)  130 - 400 x10(3)/mcL Final    MPV 11/06/2024 12.7 (H)  7.4 - 10.4 fL Final    Neut % 11/06/2024 47.8  % Final    Lymph % 11/06/2024 40.3  % Final    Mono % 11/06/2024 9.8  % Final    Eos % 11/06/2024 1.5  % Final    Basophil % 11/06/2024 0.4  % " Final    Lymph # 11/06/2024 2.11  0.6 - 4.6 x10(3)/mcL Final    Neut # 11/06/2024 2.50  2.1 - 9.2 x10(3)/mcL Final    Mono # 11/06/2024 0.51  0.1 - 1.3 x10(3)/mcL Final    Eos # 11/06/2024 0.08  0 - 0.9 x10(3)/mcL Final    Baso # 11/06/2024 0.02  <=0.2 x10(3)/mcL Final    IG# 11/06/2024 0.01  0 - 0.04 x10(3)/mcL Final    IG% 11/06/2024 0.2  % Final         Imaging: FINDINGS:  Transabdominal  scanning was performed. Multiple sonographic images reveal the uterus to be mildly prominent in size measuring 10.5 x 5.0 x 4.9 cm.  The uterus is retroverted in position.  The right ovary measures 2.7 x 2.0 x 2.1 cm and demonstrates venous flow.  The left ovary measures 3.4 x 2.1 x 2.8 cm and demonstrates venous flow.   The endometrium is not thickened. No free fluid is seen within the cul-de-sac. No abnormal adnexal mass is seen.     Impression:     1. Mildly prominent retroverted uterus  Assessment:     1. Iron deficiency anemia due to chronic blood loss      Drop in ferritin despite oral iron. Will set up for IV iron then have her return 6-8 weeks later with CBC and iron studies. She is already under the care of a GYN for heavy menses.    Plan:     Problem List Items Addressed This Visit       Iron deficiency anemia due to chronic blood loss - Primary    Relevant Orders    CBC Auto Differential    Comprehensive Metabolic Panel    Ferritin    Iron and TIBC
